# Patient Record
Sex: FEMALE | Race: OTHER | Employment: UNEMPLOYED | URBAN - METROPOLITAN AREA
[De-identification: names, ages, dates, MRNs, and addresses within clinical notes are randomized per-mention and may not be internally consistent; named-entity substitution may affect disease eponyms.]

---

## 2018-05-19 ENCOUNTER — HOSPITAL ENCOUNTER (INPATIENT)
Age: 73
LOS: 1 days | Discharge: HOME | DRG: 392 | End: 2018-05-21
Attending: EMERGENCY MEDICINE | Admitting: INTERNAL MEDICINE
Payer: SELF-PAY

## 2018-05-19 DIAGNOSIS — E87.1 HYPONATREMIA: Primary | ICD-10-CM

## 2018-05-19 LAB
ALBUMIN SERPL-MCNC: 3.9 G/DL (ref 3.2–4.6)
ALBUMIN/GLOB SERPL: 0.9 {RATIO} (ref 1.2–3.5)
ALP SERPL-CCNC: 85 U/L (ref 50–136)
ALT SERPL-CCNC: 27 U/L (ref 12–65)
ANION GAP SERPL CALC-SCNC: 13 MMOL/L (ref 7–16)
AST SERPL-CCNC: 47 U/L (ref 15–37)
BASOPHILS # BLD: 0 K/UL (ref 0–0.2)
BASOPHILS NFR BLD: 0 % (ref 0–2)
BILIRUB SERPL-MCNC: 1 MG/DL (ref 0.2–1.1)
BUN SERPL-MCNC: 19 MG/DL (ref 8–23)
CALCIUM SERPL-MCNC: 9.2 MG/DL (ref 8.3–10.4)
CHLORIDE SERPL-SCNC: 72 MMOL/L (ref 98–107)
CO2 SERPL-SCNC: 31 MMOL/L (ref 21–32)
CREAT SERPL-MCNC: 1.23 MG/DL (ref 0.6–1)
DIFFERENTIAL METHOD BLD: ABNORMAL
EOSINOPHIL # BLD: 0.5 K/UL (ref 0–0.8)
EOSINOPHIL NFR BLD: 5 % (ref 0.5–7.8)
ERYTHROCYTE [DISTWIDTH] IN BLOOD BY AUTOMATED COUNT: 11.8 % (ref 11.9–14.6)
GLOBULIN SER CALC-MCNC: 4.4 G/DL (ref 2.3–3.5)
GLUCOSE SERPL-MCNC: 96 MG/DL (ref 65–100)
HCT VFR BLD AUTO: 33.8 % (ref 35.8–46.3)
HGB BLD-MCNC: 12.8 G/DL (ref 11.7–15.4)
IMM GRANULOCYTES # BLD: 0.1 K/UL (ref 0–0.5)
IMM GRANULOCYTES NFR BLD AUTO: 1 % (ref 0–5)
LYMPHOCYTES # BLD: 2.7 K/UL (ref 0.5–4.6)
LYMPHOCYTES NFR BLD: 24 % (ref 13–44)
MCH RBC QN AUTO: 31.1 PG (ref 26.1–32.9)
MCHC RBC AUTO-ENTMCNC: 37.9 G/DL (ref 31.4–35)
MCV RBC AUTO: 82 FL (ref 79.6–97.8)
MONOCYTES # BLD: 1.1 K/UL (ref 0.1–1.3)
MONOCYTES NFR BLD: 10 % (ref 4–12)
NEUTS SEG # BLD: 6.7 K/UL (ref 1.7–8.2)
NEUTS SEG NFR BLD: 60 % (ref 43–78)
PLATELET # BLD AUTO: 272 K/UL (ref 150–450)
PMV BLD AUTO: 9.9 FL (ref 10.8–14.1)
POTASSIUM SERPL-SCNC: 4 MMOL/L (ref 3.5–5.1)
PROT SERPL-MCNC: 8.3 G/DL (ref 6.3–8.2)
RBC # BLD AUTO: 4.12 M/UL (ref 4.05–5.25)
SODIUM SERPL-SCNC: 116 MMOL/L (ref 136–145)
WBC # BLD AUTO: 11.2 K/UL (ref 4.3–11.1)

## 2018-05-19 PROCEDURE — 80053 COMPREHEN METABOLIC PANEL: CPT | Performed by: EMERGENCY MEDICINE

## 2018-05-19 PROCEDURE — 85025 COMPLETE CBC W/AUTO DIFF WBC: CPT | Performed by: EMERGENCY MEDICINE

## 2018-05-19 PROCEDURE — 99284 EMERGENCY DEPT VISIT MOD MDM: CPT | Performed by: EMERGENCY MEDICINE

## 2018-05-19 NOTE — IP AVS SNAPSHOT
Summary of Care Report The Summary of Care report has been created to help improve care coordination. Users with access to [employer] or [employer] [facility] (Web-based application) may access additional patient information including the Discharge Summary. If you are not currently a [address] user and need more information, please call the number listed below in the [place] [address] section and ask to be connected with Medical Records. Facility Information Name Address Phone [address] [address] [address] [phone] Patient Information Patient Name Sex SAEID Tran ([id number]) Female 1945 Discharge Information Admitting Provider Service Area Unit Tae Earl MD / [address] [facility] / [phone] Discharge Provider Discharge Date/Time Discharge Disposition Destination (none) 2018 Midday (Pending) AHR (none) Patient Language Language Romansh [40] Hospital Problems as of 2018  Reviewed: 2018  1:22 AM by Tae Ealr MD  
  
  
  
 Class Noted - Resolved Last Modified POA Active Problems * (Principal)Acute hyponatremia  2018 - Present 2018 by Tae Earl MD Unknown Entered by Tae Earl MD  
  Hypertension  2018 - Present 2018 by Tae Earl MD Yes Entered by Tae Earl MD  
  Nausea & vomiting  2018 - Present 2018 by Tae Earl MD Yes Entered by Tae Earl MD  
  
Non-Hospital Problems as of 2018  Reviewed: 2018  1:22 AM by Tae Earl MD  
 None You are allergic to the following No active allergies Current Discharge Medication List  
  
Notice You have not been prescribed any medications. Follow-up Information Follow up With Details Comments Contact Info None   None (395) Patient stated that they have no PCP [facility] Kathi Go in 1 week Need to follow up with MD in 1 week. May go to [facility] [address] [place] [address] 
[phone] Discharge Instructions Presión arterial disha: Instrucciones de cuidado - [ High Blood Pressure: Care Instructions ] Instrucciones de cuidado Si varghese presión arterial suele ser superior a 140/90, usted tiene presión arterial disha o hipertensión. [place] significa que el número de [place] es 140 o superior o que el número de abajo es 90 o superior, o ambas cosas. A pesar de lo que mucha gente zuly, la hipertensión no suele causar dolor de susanna ni provocar mareos o aturdimiento. Generalmente, no presenta síntomas. Sin embargo, aumenta el riesgo de ataque al corazón, ataque cerebral, y daños en los riñones o en los ojos. A mayor presión arterial, mayor riesgo. Varghese médico le dará quoc meta para varghese presión arterial. Varghese meta se basará en varghese alice y varghese edad. Un ejemplo de meta es mantener varghese presión arterial por debajo de 140/90. Los cambios de estilo de tanya, demetrio alimentarse en forma saludable y [place], siempre son importantes para ayudarle a bajar la presión arterial. También podría violetta medicamentos para lograr varghese meta para la presión arterial. 
La atención de seguimiento es quoc parte clave de varghese tratamiento y seguridad. Asegúrese de hacer y acudir a todas las citas, y llame a varghese médico si está teniendo problemas. También es quoc buena idea saber los resultados de los exámenes y mantener quoc lista de los medicamentos que racheal. Cómo puede cuidarse en el hogar? Tratamiento médico 
· Si choco de violetta molly medicamentos, varghese presión arterial volverá a subir. Es posible que deba violetta un tipo de [place], o más de [place], para reducir la presión arterial. Sea sil con los medicamentos.  [place] medicamento exactamente demetrio se lo hayan indicado. Llame a varghese médico si zuly estar teniendo problemas con varghese medicamento. · Hable con varghese médico antes de empezar a violetta [employer]. La aspirina puede ayudar a determinadas personas a reducir varghese riesgo de tener un ataque cardíaco o un ataque cerebral. Dunia violetta aspirina no es adecuado para todo el TRENGEREID, debido a que puede causar sangrado grave. · Visite a varghese médico periódicamente. Usted podría necesitar consultar a varghese médico con más frecuencia al principio o hasta que se reduzca varghese presión arterial. 
· Si usted está tomando medicamentos para la presión arterial, hable con varghese médico antes de violetta medicamentos descongestionantes o antiinflamatorios, demetrio ibuprofeno. Algunos de estos medicamentos pueden elevar la presión arterial. 
· Aprenda a revisarse la presión arterial en varghese hogar. Cambios en el estilo de tanya · Mantenga un peso saludable. [place] es particularmente importante si aumenta de peso en la tony de la cintura. Bajar solo 10 libras (4.5 kg) puede ayudarle a reducir varghese presión arterial. 
· Ana más ejercicios si varghese médico se lo recomienda. Caminar es quoc buena opción. Poco a poco, aumente la distancia que [employer]. Intente hacer por lo menos 30 minutos de ejercicio la mayoría de los días de la [place]. También podría nadar, montar en bicicleta o hacer otras actividades. · No tome alcohol o limite la cantidad que christine. Hable con varghese médico acerca de si puede violetta alcohol. · Trate de limitar la cantidad de sodio que consume a menos de 2,300 miligramos (mg) al día. Varghese médico podría pedirle que trate de consumir menos de 1,500 mg al día. · Coma abundantes frutas (demetrio bananas [plátanos] y naranjas), verduras, legumbres, granos integrales y productos lácteos de bajo contenido de Netta Luis Daniel. · Reduzca la cantidad de grasas saturadas en varghese dieta.  Los productos derivados de los [place], demetrio la [place], el queso y la carne, contienen grasas saturadas. El limitar estos alimentos podría ayudarle a bajar de peso y [place] reducir varghese riesgo de quoc enfermedad cardíaca. · No fume. El hábito de fumar aumenta varghese riesgo de ataque cerebral y ataque al corazón. Si necesita ayuda para dejar de fumar, hable con varghese médico sobre programas y medicamentos para dejar de fumar. Estos pueden aumentar molly probabilidades de dejar el hábito para siempre. Cuándo debe pedir ayuda? Llame al 911 en cualquier momento que considere que necesita atención de [place]. [place] puede significar que tiene síntomas que sugieren que varghese presión arterial le está causando un problema cardíaco o vascular grave. Varghese presión arterial podría ser superior a 180/110. ? Por ejemplo, llame al 911 si: 
? · Tiene síntomas de un ataque al corazón. Estos pueden incluir: ¨ Dolor o presión en el pecho, o quoc sensación extraña en el pecho. ¨ Sudoración. ¨ Falta de aire. ¨ Náuseas o vómito. ¨ Dolor, presión o quoc sensación extraña en la espalda, el griselda, la mandíbula, la parte superior del abdomen o en martina o ambos hombros o brazos. ¨ Aturdimiento o debilidad repentina. ¨ Latidos del corazón rápidos o irregulares. ? · Tiene síntomas de un ataque cerebral. Estos pueden incluir: 
¨ Entumecimiento, hormigueo, debilidad o parálisis repentinos en la vonnie, el brazo o la pierna, sobre todo en un solo lado del cuerpo. ¨ Cambios repentinos en la visión. ¨ Dificultades repentinas para hablar. ¨ Confusión repentina o dificultad súbita para comprender frases sencillas. ¨ Problemas repentinos para caminar o mantener el equilibrio. ¨ Dolor de [place] intenso y repentino, distinto de los darlyn de susanna anteriores. ? · Tiene un dolor intenso en la espalda o el abdomen. ? No espere a que la presión arterial baje por sí ana laura. Obtenga ayuda de inmediato. ? Llame a varghese médico ahora mismo o busque atención de inmediato si: 
?  · Tiene la presión arterial mucho más disha de lo normal (demetrio 180/110 o más disha), bulmaro no tiene síntomas. ? · Piensa que la presión arterial disha le está provocando síntomas, demetrio: ¨ Dolor de susanna intenso. [address] ? Vigile de cerca los [place], y asegúrese de comunicarse con varghese médico si: 
? · Varghese presión arterial mide 140/90 o más en al menos 2 ocasiones. Rule significa que el número de [place] es 140 o superior o el número de abajo es 90 o superior, o ambas cosas. ? · Amber que podría estar teniendo efectos secundarios de los medicamentos para la presión arterial.  
? · Varghese presión arterial suele ser normal, bulmaro se eleva por encima de lo normal en al menos 2 ocasiones. Dónde puede encontrar más información en inglés? Elder Slimmer a [email or web address]-simeon.info/. Kristina Records K136 en la búsqueda para aprender más acerca de \"Presión arterial disha: Instrucciones de cuidado - [ High Blood Pressure: Care Instructions ]. \" 
Revisado: 21 septiembre, 2016 Versión del contenido: 11.4 © 4602-6532 Healthwise, Incorporated. Las instrucciones de cuidado fueron adaptadas bajo licencia por [employer] (which disclaims liability or warranty for this information). Si usted tiene [place] afección médica o sobre estas instrucciones, siempre pregunte a varghese profesional de alice. Healthwise, Incorporated niega toda garantía o responsabilidad por varghese uso de esta información. Hiponatremia: Instrucciones de cuidado - [ Hyponatremia: Care Instructions ] Instrucciones de cuidado Hiponatremia significa que no tiene suficiente sodio en [place] [employer]. Puede causar náuseas, vómitos y dolor de Dyan Bright. O puede que usted no tenga apetito. En casos graves, puede causar convulsiones, coma o incluso la [place]. La hiponatremia no es quoc enfermedad. Es un problema causado por algo más, demetrio medicamentos o hacer ejercicio por mucho tiempo cuando hace calor.  
Puede desarrollar hiponatremia si pierde mucho líquido y [employer] grandes cantidades de agua u otros líquidos que no contienen [employer]. También puede desarrollarla si tiene problemas del Verita Larisa, del hígado o del corazón u otros problemas de alice. El tratamiento se centra en normalizar molly niveles de [place]. La atención de seguimiento es quoc parte clave de varghese tratamiento y seguridad. Asegúrese de hacer y acudir a todas las citas, y llame a varghese médico si está teniendo problemas. También es quoc buena idea saber los resultados de los exámenes y mantener quoc lista de los medicamentos que racheal. Cómo puede cuidarse en el hogar? · Si varghese médico se lo recomienda, tracy líquidos que contengan sodio. Las bebidas deportivas son [place] opción. O puede comer alimentos salados. · Si varghese médico se lo recomienda, limite la cantidad de agua que christine. Y limite los líquidos [place] parte es agua. Estos incluyen el té, el café y [place]. · [employer] medicamentos exactamente demetrio le fueron recetados. Llame a varghese médico si tiene algún problema con los medicamentos. · Hágase análisis para controlar molly niveles de sodio cuando se lo diga varghese médico. 

Cuándo debe pedir ayuda? Llame al 911 en cualquier momento que considere que necesita atención de emergencia. Por ejemplo, llame si: 
? · Tiene convulsiones. ? · Se desmayó (perdió el conocimiento). ?Llame a varghese médico ahora mismo o busque atención médica inmediata si: 
? · Está confuso o no puede concentrarse. ? · Tiene poco o nada de apetito. ? · Siente el estómago revuelto o vomita. ? · Tiene dolor de susanna. ? · Tiene cambios en el estado de ánimo. ? · Se siente más fatigado que de costumbre. ?Preste especial atención a los cambios en vagrhese alice y asegúrese de comunicarse con varghese médico si: 
? · No mejora demetrio se esperaba. Dónde puede encontrar más información en inglés? Emily Scrivener a http://[email or web address]-simeon.info/.  
Daily Rodriguez B230 en la búsqueda para aprender Justen Quiroz de \"Hiponatremia: Instrucciones de cuidado - [ Hyponatremia: Care Instructions ]. \" 
Revisado: 14 octubre, 2016 Versión del contenido: 11.4 © 0532-5003 Healthwise, Incorporated. Las instrucciones de cuidado fueron adaptadas bajo licencia por [employer] (which disclaims liability or warranty for this information). Si usted tiene [place] afección médica o sobre estas instrucciones, siempre pregunte a varghese profesional de alice. Healthwise, Incorporated niega toda garantía o responsabilidad por varghese uso de esta información. DISCHARGE SUMMARY from Nurse PATIENT INSTRUCTIONS: 
 
After general anesthesia or intravenous sedation, for 24 hours or while taking prescription Narcotics: · Limit your activities · Do not drive and operate hazardous machinery · Do not make important personal or business decisions · Do  not drink alcoholic beverages · If you have not urinated within 8 hours after discharge, please contact your surgeon on call. Report the following to your surgeon: 
· Excessive pain, swelling, redness or odor of or around the surgical area · Temperature over 100.5 · Nausea and vomiting lasting longer than 4 hours or if unable to take medications · Any signs of decreased circulation or nerve impairment to extremity: change in color, persistent  numbness, tingling, coldness or increase pain · Any questions What to do at Home: *  Please give a list of your current medications to your Primary Care Provider. *  Please update this list whenever your medications are discontinued, doses are 
    changed, or new medications (including over-the-counter products) are added. *  Please carry medication information at all times in case of emergency situations. These are general instructions for a healthy lifestyle: No smoking/ No tobacco products/ Avoid exposure to second hand smoke Surgeon General's Warning:  Quitting smoking now greatly reduces serious risk to your health. Obesity, smoking, and sedentary lifestyle greatly increases your risk for illness A healthy diet, regular physical exercise & weight monitoring are important for maintaining a healthy lifestyle You may be retaining fluid if you have a history of heart failure or if you experience any of the following symptoms:  Weight gain of 3 pounds or more overnight or 5 pounds in a week, increased swelling in our hands or feet or shortness of breath while lying flat in bed. Please call your doctor as soon as you notice any of these symptoms; do not wait until your next office visit. Recognize signs and symptoms of STROKE: 
 
F-face looks uneven A-arms unable to move or move unevenly S-speech slurred or non-existent T-time-call 911 as soon as signs and symptoms begin-DO NOT go Back to bed or wait to see if you get better-TIME IS BRAIN. Warning Signs of HEART ATTACK Call 911 if you have these symptoms: 
? Chest discomfort. Most heart attacks involve discomfort in the center of the chest that lasts more than a few minutes, or that goes away and comes back. It can feel like uncomfortable pressure, squeezing, fullness, or pain. ? Discomfort in other areas of the upper body. Symptoms can include pain or discomfort in one or both arms, the back, neck, jaw, or stomach. ? Shortness of breath with or without chest discomfort. ? Other signs may include breaking out in a cold sweat, nausea, or lightheadedness. Don't wait more than five minutes to call [address]! Fast action can save your life. Calling 911 is almost always the fastest way to get lifesaving treatment. Emergency Medical Services staff can begin treatment when they arrive  up to an hour sooner than if someone gets to the hospital by car. The discharge information has been reviewed with the patient. The patient verbalized understanding.  
Discharge medications reviewed with the patient and appropriate educational materials and side effects teaching were provided. ___________________________________________________________________________________________________________________________________ Chart Review Routing History No Routing History on File

## 2018-05-19 NOTE — IP AVS SNAPSHOT
[address] [facility] [address] 
 
 
 [address] [facility] 322 [facility] 
[phone] Patient: Dali Bush MRN: [id number] DFE:[id number] About your hospitalization You were admitted on:  May 20, 2018 You last received care in the:  [facility] [place] You were discharged on:  May 21, 2018 Why you were hospitalized Your primary diagnosis was:  Acute Hyponatremia Your diagnoses also included:  Hypertension, Nausea & Vomiting Follow-up Information Follow up With Details Comments Contact Info None   None (395) Patient stated that they have no PCP [facility] Kathi Go in 1 week Need to follow up with MD in 1 week. May go to [facility] [address] [facility] [place] [address] 
[phone] Discharge Orders None A check bismark indicates which time of day the medication should be taken. My Medications Notice You have not been prescribed any medications. Discharge Instructions Presión arterial disha: Instrucciones de cuidado - [ High Blood Pressure: Care Instructions ] Instrucciones de cuidado Si varghese presión arterial suele ser superior a 140/90, usted tiene presión arterial disha o hipertensión. [place] significa que el número de [place] es 140 o superior o que el número de abajo es 90 o superior, o ambas cosas. A pesar de lo que mucha gente zuly, la hipertensión no suele causar dolor de susanna ni provocar mareos o aturdimiento. Generalmente, no presenta síntomas. Sin embargo, aumenta el riesgo de ataque al corazón, ataque cerebral, y daños en los riñones o en los ojos. A mayor presión arterial, mayor riesgo. Varghese médico le dará quoc meta para varghese presión arterial. Varghese meta se basará en varghese alice y varghese edad. Un ejemplo de meta es mantener varghese presión arterial por debajo de 140/90.  
Los cambios de estilo de tanya, demetrio alimentarse en forma saludable y hacer actividad, siempre son importantes para ayudarle a bajar la presión arterial. También podría violetta medicamentos para lograr varghese meta para la presión arterial. 
La atención de seguimiento es quoc parte clave de varghese tratamiento y seguridad. Asegúrese de hacer y acudir a todas las citas, y llame a varghese médico si está teniendo problemas. También es quoc buena idea saber los resultados de los exámenes y mantener quoc lista de los medicamentos que racheal. Cómo puede cuidarse en el hogar? Tratamiento médico 
· Si choco de violetta molly medicamentos, varghese presión arterial volverá a subir. Es posible que deba violetta un tipo de [place], o más de [place], para reducir la presión arterial. Sea sil con los medicamentos. [place] varghese medicamento exactamente demetrio se lo hayan indicado. Llame a varghese médico si zuly estar teniendo problemas con varghese medicamento. · Hable con varghese médico antes de empezar a violetta Natanael Hotels. La aspirina puede ayudar a determinadas personas a reducir varghese riesgo de tener un ataque cardíaco o un ataque cerebral. Dunia violetta aspirina no es adecuado para todo el TRENGEREID, debido a que puede causar sangrado grave. · Visite a varghese médico periódicamente. Usted podría necesitar consultar a varghese médico con más frecuencia al principio o hasta que se reduzca varghese presión arterial. 
· Si usted está tomando medicamentos para la presión arterial, hable con varghese médico antes de violetta medicamentos descongestionantes o antiinflamatorios, demetrio ibuprofeno. Algunos de estos medicamentos pueden elevar la presión arterial. 
· Aprenda a revisarse la presión arterial en varghese hogar. Cambios en el estilo de tanya · Mantenga un peso saludable. [place] es particularmente importante si aumenta de peso en la tony de la cintura. Bajar solo 10 libras (4.5 kg) puede ayudarle a reducir varghese presión arterial. 
· Ana más ejercicios si varghese médico se lo recomienda. Caminar es quoc buena opción. Poco a poco, aumente la distancia que [employer]. Intente hacer por lo menos 30 minutos de ejercicio la mayoría de los días de la Syl[place]. También podría nadar, montar en bicicleta o hacer otras actividades. · No tome alcohol o limite la cantidad que christine. Hable con varghese médico acerca de si puede violetta alcohol. · Trate de limitar la cantidad de sodio que consume a menos de 2,300 miligramos (mg) al día. Varghese médico podría pedirle que trate de consumir menos de 1,500 mg al día. · Coma abundantes frutas (demetrio bananas [plátanos] y naranjas), verduras, legumbres, granos integrales y productos lácteos de bajo contenido de [place]. · Reduzca la cantidad de grasas saturadas en varghese dieta. Los productos derivados de los [place], demetrio la [place], el queso y la carne, contienen grasas saturadas. El limitar estos alimentos podría ayudarle a bajar de peso y [place] reducir varghese riesgo de quoc enfermedad cardíaca. · No fume. El hábito de fumar aumenta varghese riesgo de ataque cerebral y ataque al corazón. Si necesita ayuda para dejar de fumar, hable con varghese médico sobre programas y medicamentos para dejar de fumar. Estos pueden aumentar molly probabilidades de dejar el hábito para siempre. Cuándo debe pedir ayuda? Llame al 911 en cualquier momento que considere que necesita atención de [place]. [place] puede significar que tiene síntomas que sugieren que varghese presión arterial le está causando un problema cardíaco o vascular grave. Varghese presión arterial podría ser superior a 180/110. ? Por ejemplo, llame al 911 si: 
? · Tiene síntomas de un ataque al corazón. Estos pueden incluir: ¨ Dolor o presión en el pecho, o quoc sensación extraña en el pecho. ¨ Sudoración. ¨ Falta de aire. ¨ Náuseas o vómito. ¨ Dolor, presión o quoc sensación extraña en la espalda, el griselda, la mandíbula, la parte superior del abdomen o en martina o ambos hombros o brazos. ¨ Aturdimiento o debilidad repentina. ¨ Latidos del corazón rápidos o irregulares. ? · Tiene síntomas de un ataque cerebral. Estos pueden incluir: ¨ Entumecimiento, hormigueo, debilidad o parálisis repentinos en la vonnie, el brazo o la pierna, sobre todo en un solo lado del cuerpo. ¨ Cambios repentinos en la visión. ¨ Dificultades repentinas para hablar. ¨ Confusión repentina o dificultad súbita para comprender frases sencillas. ¨ Problemas repentinos para caminar o mantener el equilibrio. ¨ Dolor de [place] intenso y repentino, distinto de los darlyn de susanna anteriores. ? · Tiene un dolor intenso en la espalda o el abdomen. ? No espere a que la presión arterial baje por sí ana laura. Obtenga ayuda de inmediato. ? Llame a varghese médico ahora mismo o busque atención de inmediato si: 
? · Tiene la presión arterial mucho más disha de lo normal (demetrio 180/110 o más disha), bulmaro no tiene síntomas. ? · Piensa que la presión arterial disha le está provocando síntomas, demetrio: ¨ Dolor de susanna intenso. [address] ? Vigile de cerca los [facility], y asegúrese de comunicarse con varghese médico si: 
? · Varghese presión arterial mide 140/90 o más en al menos 2 ocasiones. [place] significa que el número de [place] es 140 o superior o el número de [facility] es 90 o superior, o ambas cosas. ? · Amber que podría estar teniendo efectos secundarios de los medicamentos para la presión arterial.  
? · Varghese presión arterial suele ser normal, bulmaro se eleva por encima de lo normal en al menos 2 ocasiones. Dónde puede encontrar más información en inglés? Richard Gilby a [email or web address]-[email or web address].info/. [place]ton Ashley H298 en la búsqueda para aprender más acerca de \"Presión arterial disha: Instrucciones de cuidado - [ High Blood Pressure: Care Instructions ]. \" 
Revisado: 21 septiembre, 2016 Versión del contenido: 11.4 © 4876-9247 Healthwise, adhoclabs. Las instrucciones de cuidado fueron adaptadas bajo licencia por Good Help Connections (which disclaims liability or warranty for this information).  Si usted tiene preguntas sobre quoc afección médica o sobre estas instrucciones, siempre pregunte a varghese profesional de alice. [facility] niega toda garantía o responsabilidad por varghese uso de esta información. Hiponatremia: Instrucciones de cuidado - [ Hyponatremia: Care Instructions ] Instrucciones de cuidado Hiponatremia significa que no tiene suficiente sodio en [place] [employer]. Puede causar náuseas, vómitos y dolor de [place]. O puede que usted no tenga apetito. En casos graves, puede causar convulsiones, coma o incluso la [place]. La hiponatremia no es quoc enfermedad. Es un problema causado por algo más, demetrio medicamentos o hacer ejercicio por mucho tiempo cuando hace calor. Puede desarrollar hiponatremia si pierde mucho líquido y luego christine grandes cantidades de agua u otros líquidos que no contienen mucho sodio. También puede desarrollarla si tiene problemas del [place], del hígado o del corazón u otros problemas de alice. El tratamiento se centra en normalizar molly niveles de [place]. La atención de seguimiento es quoc parte clave de varghese tratamiento y seguridad. Asegúrese de hacer y acudir a todas las citas, y llame a varghese médico si está teniendo problemas. También es quoc buena idea saber los resultados de los exámenes y mantener quoc lista de los medicamentos que racheal. Cómo puede cuidarse en el hogar? · Si varghese médico se lo recomienda, tracy líquidos que contengan sodio. Las bebidas deportivas son Leita Kale opción. O puede comer alimentos salados. · Si varghese médico se lo recomienda, limite la cantidad de agua que christine. Y limite los líquidos Yvetta Bee parte es agua. Estos incluyen el té, el café y [place]. · [employer] medicamentos exactamente demetrio le fueron recetados. Llame a varghese médico si tiene algún problema con los medicamentos. · Hágase análisis para controlar molly niveles de sodio cuando se lo diga varghese médico. 

Cuándo debe pedir ayuda?  
Llame al 911 en cualquier momento que considere que necesita atención de emergencia. Por ejemplo, llame si: 
? · Tiene convulsiones. ? · Se desmayó (perdió el conocimiento). ?Llame a varghese médico ahora mismo o busque atención médica inmediata si: 
? · Está confuso o no puede concentrarse. ? · Tiene poco o nada de apetito. ? · Siente el estómago revuelto o vomita. ? · Tiene dolor de susanna. ? · Tiene cambios en el estado de ánimo. ? · Se siente más fatigado que de costumbre. ?Preste especial atención a los cambios en varghese alice y asegúrese de comunicarse con varghese médico si: 
? · No mejora demetrio se esperaba. Dónde puede encontrar más información en inglés? Biju Doyle a http://sebastian-simeon.info/. Eugene SANTOS990 en la búsqueda para aprender más acerca de \"Hiponatremia: Instrucciones de cuidado - [ Hyponatremia: Care Instructions ]. \" 
Revisado: 14 octubre, 2016 Versión del contenido: 11.4 © 3487-8649 Healthwise, Incorporated. Las instrucciones de cuidado fueron adaptadas bajo licencia por Good Help Connections (which disclaims liability or warranty for this information). Si usted tiene [place] afección médica o sobre estas instrucciones, siempre pregunte a varghese profesional de alice. Healthwise, Incorporated niega toda garantía o responsabilidad por varghese uso de esta información. DISCHARGE SUMMARY from Nurse PATIENT INSTRUCTIONS: 
 
After general anesthesia or intravenous sedation, for 24 hours or while taking prescription Narcotics: · Limit your activities · Do not drive and operate hazardous machinery · Do not make important personal or business decisions · Do  not drink alcoholic beverages · If you have not urinated within 8 hours after discharge, please contact your surgeon on call. Report the following to your surgeon: 
· Excessive pain, swelling, redness or odor of or around the surgical area · Temperature over 100.5 · Nausea and vomiting lasting longer than 4 hours or if unable to take medications · Any signs of decreased circulation or nerve impairment to extremity: change in color, persistent  numbness, tingling, coldness or increase pain · Any questions What to do at Home: *  Please give a list of your current medications to your Primary Care Provider. *  Please update this list whenever your medications are discontinued, doses are 
    changed, or new medications (including over-the-counter products) are added. *  Please carry medication information at all times in case of emergency situations. These are general instructions for a healthy lifestyle: No smoking/ No tobacco products/ Avoid exposure to second hand smoke Surgeon General's Warning:  Quitting smoking now greatly reduces serious risk to your health. Obesity, smoking, and sedentary lifestyle greatly increases your risk for illness A healthy diet, regular physical exercise & weight monitoring are important for maintaining a healthy lifestyle You may be retaining fluid if you have a history of heart failure or if you experience any of the following symptoms:  Weight gain of 3 pounds or more overnight or 5 pounds in a week, increased swelling in our hands or feet or shortness of breath while lying flat in bed. Please call your doctor as soon as you notice any of these symptoms; do not wait until your next office visit. Recognize signs and symptoms of STROKE: 
 
F-face looks uneven A-arms unable to move or move unevenly S-speech slurred or non-existent T-time-call 911 as soon as signs and symptoms begin-DO NOT go Back to bed or wait to see if you get better-TIME IS BRAIN. Warning Signs of HEART ATTACK Call 911 if you have these symptoms: 
? Chest discomfort. Most heart attacks involve discomfort in the center of the chest that lasts more than a few minutes, or that goes away and comes back. It can feel like uncomfortable pressure, squeezing, fullness, or pain. ? Discomfort in other areas of the upper body. Symptoms can include pain or discomfort in one or both arms, the back, neck, jaw, or stomach. ? Shortness of breath with or without chest discomfort. ? Other signs may include breaking out in a cold sweat, nausea, or lightheadedness. Don't wait more than five minutes to call [address]! Fast action can save your life. Calling 911 is almost always the fastest way to get lifesaving treatment. Emergency Medical Services staff can begin treatment when they arrive  up to an hour sooner than if someone gets to the hospital by car. The discharge information has been reviewed with the patient. The patient verbalized understanding. Discharge medications reviewed with the patient and appropriate educational materials and side effects teaching were provided. ___________________________________________________________________________________________________________________________________ Comput[employer]art Announcement We are excited to announce that we are making your provider's discharge notes available to you in PassivSystemst. You will see these notes when they are completed and signed by the physician that discharged you from your recent hospital stay. If you have any questions or concerns about any information you see in PassivSystemst, please call the Health Information Department where you were seen or reach out to your Primary Care Provider for more information about your plan of care. Introducing [facility] & HEALTH SERVICES! [facility] introduce portal paciente Computehart . Ahora se puede acceder a partes de varghese expediente médico, enviar por correo electrónico la oficina de varghese médico y solicitar renovaciones de medicamentos en línea. En varghese navegador de Internet , [employer]orn [employer] a [email or web address] Ana liss en el usuario por Stewart Fernando? Starlet Ligas clic aquí en la sesión Kimberlee Rede. Verá la página de registro [place]. Ingrese varghese código de Bank [employer] Lisa abigail y demetrio aparece a continuación. Usted no tendrá que [employer] código después de cuate completado el proceso de registro . Si usted no se inscribe antes de la fecha de caducidad , debe solicitar un nuevo código. · MyChart Código de acceso : [id number] Expires: 8/17/2018 10:45 PM 
 
Ingresa los últimos cuatro dígitos de varghese Número de Seguro Social ( xxxx ) y fecha de nacimiento ( dd / mm / aaaa ) demetrio se indica y ana clic en Enviar. Usted será llevado a la siguiente página de registro . Crear un ID MyChart . Esta será varghese ID de inicio de sesión de MyChart y no puede ser [place] , por lo que pensar en quoc que es [place] y fácil de recordar . Crear quoc contraseña MyChart . Usted puede cambiar varghese contraseña en cualquier momento . Ingrese varghese Password Reset de preguntas y [place] . [place] se puede utilizar en un momento posterior si usted olvida varghese contraseña. Introduzca varghese dirección de correo electrónico . Kervin [place] recibirá quoc notificación por correo electrónico cuando la nueva información está disponible en MyChart . Edmoresepideh Carvajal clic en Registrarse. Hua Chandler nolvia y descargar porciones de varghese expediente médico. 
Ana liss en el enlace de descarga del menú Resumen para descargar quoc copia portátil de varghese información médica . Si tiene [employer] , por favor visite la sección de preguntas frecuentes del sitio web MyChart . Recuerde, MyChart NO es que se utilizará para las necesidades urgentes. Para emergencias médicas , llame al 911 . Ahora disponible en varghese iPhone y Android ! Introducing Amanuel Syed As a [facility] patient, I wanted to make you aware of our electronic visit tool called Amanuel Syed. Thiago Roberto 24/7 allows you to connect within minutes with a medical provider 24 hours a day, seven days a week via a mobile device or tablet or logging into a secure website from your computer.   You can access [facility] Ayana 24/7 from anywhere in the [place]. A virtual visit might be right for you when you have a simple condition and feel like you just dont want to get out of bed, or cant get away from work for an appointment, when your regular [facility] provider is not available (evenings, weekends or holidays), or when youre out of town and need minor care. Electronic visits cost only $49 and if the [email or web address] 24/7 provider determines a prescription is needed to treat your condition, one can be electronically transmitted to a nearby pharmacy*. Please take a moment to enroll today if you have not already done so. The enrollment process is free and takes just a few minutes. To enroll, please download the [email or web address] 24/7 jojo to your tablet or phone, or visit [email or web address] to enroll on your computer. And, as an [address] [place] [address] patient with a PHARMAJET account, the results of your visits will be scanned into your electronic medical record and your primary care provider will be able to view the scanned results. We urge you to continue to see your regular [facility] provider for your ongoing medical care. And while your primary care provider may not be the one available when you seek a Amanuel Rustamessie virtual visit, the peace of mind you get from getting a real diagnosis real time can be priceless. For more information on Amanuel Syed, view our Frequently Asked Questions (FAQs) at [email or web address]. Sincerely, 
 
Selam You MD 
Chief Medical Officer [facility] Kait Valverde *:  certain medications cannot be prescribed via Amanuel iMemoriesessie Providers Seen During Your Hospitalization Provider Specialty Primary office phone Madalyn Dang MD Emergency Medicine [phone] Bulmaro De La Cruz MD Internal Medicine [phone] Your Primary Care Physician (PCP) Primary Care Physician Office Phone Office Fax  NONE ** None ** ** None **  
 You are allergic to the following No active allergies Recent Documentation Height Weight BMI Smoking Status 1.524 m 64.3 kg 27.69 kg/m2 Former Smoker Emergency Contacts Name Discharge Info Relation Home Work Mobile Zan Cheatham  Other Relative [6] [phone] Patient Belongings The following personal items are in your possession at time of discharge: 
     Visual Aid: None             Clothing: With patient Please provide this summary of care documentation to your next provider. Signatures-by signing, you are acknowledging that this After Visit Summary has been reviewed with you and you have received a copy. Patient Signature:  ____________________________________________________________ Date:  ____________________________________________________________  
  
Modestostephen Wall Provider Signature:  ____________________________________________________________ Date:  ____________________________________________________________  
  
  
   
  
Lina Hernadez [address] [facility] 
[phone] Patient: Maricel Espino MRN: [id number] JTM:[id number] Sobre pradhan hospitalización Le admitieron el:  May 20, 2018 Pradhan tratamiento más reciente fue el:  [facility] 8 INTERMEDIATE CARE UNIT Le dieron de disha el:  May 21, 2018 Por qué le ingresaron Pradhan diagnosis primaria es:  Acute Hyponatremia Pradhan diagnosis también incluye:  Hypertension, Nausea & Vomiting Follow-up Information Follow up With Details Comments Contact Info None   None (395) Patient stated that they have no PCP [employer] Kathi Go in 1 week Need to follow up with MD in 1 week. May go to [facility] [address] [facility] [address] [phone] Discharge Orders [employer] A check bismark indicates which time of day the medication should be taken. My Medications Derrill Negus No se le javed recetado ningún medicamento. Instrucciones a farhat de disha Presión arterial disha: Instrucciones de cuidado - [ High Blood Pressure: Care Instructions ] Instrucciones de cuidado Si varghese presión arterial suele ser superior a 140/90, usted tiene presión arterial disha o hipertensión. [place] significa que el número de [place] es 140 o superior o que el número de abajo es 90 o superior, o ambas cosas. A pesar de lo que mucha gente zuly, la hipertensión no suele causar dolor de susanna ni provocar mareos o aturdimiento. Generalmente, no presenta síntomas. Sin embargo, aumenta el riesgo de ataque al corazón, ataque cerebral, y daños en los riñones o en los ojos. A mayor presión arterial, mayor riesgo. Varghese médico le dará quoc meta para varghese presión arterial. Varghese meta se basará en varghese alice y varghese edad. Un ejemplo de meta es mantener varghese presión arterial por debajo de 140/90. Los cambios de estilo de tanya, demetrio alimentarse en forma saludable y [place], siempre son importantes para ayudarle a bajar la presión arterial. También podría violetta medicamentos para lograr varghese meta para la presión arterial. 
La atención de seguimiento es quoc parte clave de varghese tratamiento y seguridad. Asegúrese de hacer y acudir a todas las citas, y llame a varghese médico si está teniendo problemas. También es quoc buena idea saber los resultados de los exámenes y mantener quoc lista de los medicamentos que racheal. Cómo puede cuidarse en el hogar? Tratamiento médico 
· Si choco de violetta molly medicamentos, varghese presión arterial volverá a subir. Es posible que deba violetta un tipo de [place], o más de [place], para reducir la presión arterial. Sea sil con los medicamentos. [place] varghese medicamento exactamente demetrio se lo hayan indicado. Llame a varghese médico si zuly estar teniendo problemas con varghese medicamento. · Hable con varghese médico antes de empezar a violetta [employer].  Oletha Cady aspirina puede ayudar a determinadas personas a reducir varghese riesgo de tener un ataque cardíaco o un ataque cerebral. Dunia violetta aspirina no es adecuado para todo el TRENGEREID, debido a que puede causar sangrado grave. · Visite a varghese médico periódicamente. Usted podría necesitar consultar a varghese médico con más frecuencia al principio o hasta que se reduzca varghese presión arterial. 
· Si usted está tomando medicamentos para la presión arterial, hable con varghese médico antes de violetta medicamentos descongestionantes o antiinflamatorios, demetrio ibuprofeno. Algunos de estos medicamentos pueden elevar la presión arterial. 
· Aprenda a revisarse la presión arterial en varghese hogar. Cambios en el estilo de tanya · Mantenga un peso saludable. [place] es particularmente importante si aumenta de peso en la tony de la cintura. Bajar solo 10 libras (4.5 kg) puede ayudarle a reducir varghese presión arterial. 
· Ana más ejercicios si varghese médico se lo recomienda. Caminar es quoc buena opción. Poco a poco, aumente la distancia que [employer]. Intente hacer por lo menos 30 minutos de ejercicio la mayoría de los días de la [place]. También podría nadar, montar en bicicleta o hacer otras actividades. · No tome alcohol o limite la cantidad que christine. Hable con varghese médico acerca de si puede violetta alcohol. · Trate de limitar la cantidad de sodio que consume a menos de 2,300 miligramos (mg) al día. Varghese médico podría pedirle que trate de consumir menos de 1,500 mg al día. · Coma abundantes frutas (demetrio bananas [plátanos] y naranjas), verduras, legumbres, granos integrales y productos lácteos de bajo contenido de Mirela walter. · Reduzca la cantidad de grasas saturadas en varghese dieta. Los productos derivados de los [place], demetrio la [place], el queso y la carne, contienen grasas saturadas. El limitar estos alimentos podría ayudarle a bajar de peso y [place] reducir varghese riesgo de quoc enfermedad cardíaca. · No fume.  El hábito de fumar aumenta varghese riesgo de ataque cerebral y ataque al corazón. Si necesita ayuda para dejar de fumar, hable con varghese médico sobre programas y medicamentos para dejar de fumar. Estos pueden aumentar molly probabilidades de dejar el hábito para siempre. Cuándo debe pedir ayuda? Llame al 911 en cualquier momento que considere que necesita atención de [place]. [place] puede significar que tiene síntomas que sugieren que varghese presión arterial le está causando un problema cardíaco o vascular grave. Varghese presión arterial podría ser superior a 180/110. ? Por ejemplo, llame al 911 si: 
? · Tiene síntomas de un ataque al corazón. Estos pueden incluir: ¨ Dolor o presión en el pecho, o quoc sensación extraña en el pecho. ¨ Sudoración. ¨ Falta de aire. ¨ Náuseas o vómito. ¨ Dolor, presión o quoc sensación extraña en la espalda, el griselda, la mandíbula, la parte superior del abdomen o en martina o ambos hombros o brazos. ¨ Aturdimiento o debilidad repentina. ¨ Latidos del corazón rápidos o irregulares. ? · Tiene síntomas de un ataque cerebral. Estos pueden incluir: 
¨ Entumecimiento, hormigueo, debilidad o parálisis repentinos en la vonnie, el brazo o la pierna, sobre todo en un solo lado del cuerpo. ¨ Cambios repentinos en la visión. ¨ Dificultades repentinas para hablar. ¨ Confusión repentina o dificultad súbita para comprender frases sencillas. ¨ Problemas repentinos para caminar o mantener el equilibrio. ¨ Dolor de [place] intenso y repentino, distinto de los darlyn de susanna anteriores. ? · Tiene un dolor intenso en la espalda o el abdomen. ? No espere a que la presión arterial baje por sí ana laura. Obtenga ayuda de inmediato. ? Llame a varghese médico ahora mismo o busque atención de inmediato si: 
? · Tiene la presión arterial mucho más disha de lo normal (demetrio 180/110 o más disha), bulmaro no tiene síntomas. ? · Piensa que la presión arterial disha le está provocando síntomas, demetrio: ¨ Dolor de susanna intenso. [address]. ?Vigile de cerca los [facility], y asegúrese de comunicarse con varghese médico si: 
? · Varghese presión arterial mide 140/90 o más en al menos 2 ocasiones. [place] significa que el número de [place] es 140 o superior o el número de abajo es 90 o superior, o ambas cosas. ? · Amber que podría estar teniendo efectos secundarios de los medicamentos para la presión arterial.  
? · Varghese presión arterial suele ser normal, bulmaro se eleva por encima de lo normal en al menos 2 ocasiones. Dónde puede encontrar más información en inglés? Narendra Viera a [email or web address] Kameron Olivo B711 en la búsqueda para aprender más acerca de \"Presión arterial disha: Instrucciones de cuidado - [ High Blood Pressure: Care Instructions ]. \" 
Revisado: 21 septiembre, 2016 Versión del contenido: 11.4 © 3032-5015 Healthwise, Incorporated. Las instrucciones de cuidado fueron adaptadas bajo licencia por [employer] (which disclaims liability or warranty for this information). Si usted tiene [place] afección médica o sobre estas instrucciones, siempre pregunte a varghese profesional de alice. Healthwise, Incorporated niega toda garantía o responsabilidad por varghese uso de esta información. Hiponatremia: Instrucciones de cuidado - [ Hyponatremia: Care Instructions ] Instrucciones de cuidado Hiponatremia significa que no tiene suficiente sodio en [place] [employer]. Puede causar náuseas, vómitos y dolor de [place]. O puede que usted no tenga apetito. En casos graves, puede causar convulsiones, coma o incluso la [place]. La hiponatremia no es quoc enfermedad. Es un problema causado por algo más, demetrio medicamentos o hacer ejercicio por mucho tiempo cuando hace calor. Puede desarrollar hiponatremia si pierde mucho líquido y luego christine grandes cantidades de agua u otros líquidos que no contienen mucho sodio. También puede desarrollarla si tiene problemas del Zollie Severin, del hígado o del corazón u otros problemas de alice. El tratamiento se centra en normalizar molly niveles de [place]. La atención de seguimiento es quoc parte clave de varghese tratamiento y seguridad. Asegúrese de hacer y acudir a todas las citas, y llame a varghese médico si está teniendo problemas. También es quoc buena idea saber los resultados de los exámenes y mantener quoc lista de los medicamentos que racheal. Cómo puede cuidarse en el hogar? · Si varghese médico se lo recomienda, tracy líquidos que contengan sodio. Las bebidas deportivas son Teena Anderson opción. O puede comer alimentos salados. · Si varghese médico se lo recomienda, limite la cantidad de agua que christine. Y limite los líquidos Ambika Bucker parte es agua. Estos incluyen el té, el café y [place]. · [employer] medicamentos exactamente demetrio le fueron recetados. Llame a varghese médico si tiene algún problema con los medicamentos. · Hágase análisis para controlar molly niveles de sodio cuando se lo diga varghese médico. 

Cuándo debe pedir ayuda? Llame al 911 en cualquier momento que considere que necesita atención de emergencia. Por ejemplo, llame si: 
? · Tiene convulsiones. ? · Se desmayó (perdió el conocimiento). ?Llame a varghese médico ahora mismo o busque atención médica inmediata si: 
? · Está confuso o no puede concentrarse. ? · Tiene poco o nada de apetito. ? · Siente el estómago revuelto o vomita. ? · Tiene dolor de susanna. ? · Tiene cambios en el estado de ánimo. ? · Se siente más fatigado que de costumbre. ?Preste especial atención a los cambios en varghese alice y asegúrese de comunicarse con varghese médico si: 
? · No mejora demetrio se esperaba. Dónde puede encontrar más información en inglés? Nicci Pedersen a [email or web address]-[email or web address].[email or web address] Ashanti Jordan A038 en la búsqueda para aprender más acerca de \"Hiponatremia: Instrucciones de cuidado - [ Hyponatremia: Care Instructions ]. \" 
Revisado: 14 octubre, 2016 Versión del contenido: 11.4 © 0465-4635 Healthwise, Incorporated.  Las instrucciones de cuidado fueron adaptadas bajo licencia por [employer] (which disclaims liability or warranty for this information). Si usted tiene [place] afección médica o sobre estas instrucciones, siempre pregunte a varghese profesional de alice. Healthwise, Incorporated niega toda garantía o responsabilidad por varghese uso de esta información. DISCHARGE SUMMARY from Nurse PATIENT INSTRUCTIONS: 
 
After general anesthesia or intravenous sedation, for 24 hours or while taking prescription Narcotics: · Limit your activities · Do not drive and operate hazardous machinery · Do not make important personal or business decisions · Do  not drink alcoholic beverages · If you have not urinated within 8 hours after discharge, please contact your surgeon on call. Report the following to your surgeon: 
· Excessive pain, swelling, redness or odor of or around the surgical area · Temperature over 100.5 · Nausea and vomiting lasting longer than 4 hours or if unable to take medications · Any signs of decreased circulation or nerve impairment to extremity: change in color, persistent  numbness, tingling, coldness or increase pain · Any questions What to do at Home: *  Please give a list of your current medications to your Primary Care Provider. *  Please update this list whenever your medications are discontinued, doses are 
    changed, or new medications (including over-the-counter products) are added. *  Please carry medication information at all times in case of emergency situations. These are general instructions for a healthy lifestyle: No smoking/ No tobacco products/ Avoid exposure to second hand smoke Surgeon General's Warning:  Quitting smoking now greatly reduces serious risk to your health. Obesity, smoking, and sedentary lifestyle greatly increases your risk for illness A healthy diet, regular physical exercise & weight monitoring are important for maintaining a healthy lifestyle You may be retaining fluid if you have a history of heart failure or if you experience any of the following symptoms:  Weight gain of 3 pounds or more overnight or 5 pounds in a week, increased swelling in our hands or feet or shortness of breath while lying flat in bed. Please call your doctor as soon as you notice any of these symptoms; do not wait until your next office visit. Recognize signs and symptoms of STROKE: 
 
F-face looks uneven A-arms unable to move or move unevenly S-speech slurred or non-existent T-time-call 911 as soon as signs and symptoms begin-DO NOT go Back to bed or wait to see if you get better-TIME IS BRAIN. Warning Signs of HEART ATTACK Call 911 if you have these symptoms: 
? Chest discomfort. Most heart attacks involve discomfort in the center of the chest that lasts more than a few minutes, or that goes away and comes back. It can feel like uncomfortable pressure, squeezing, fullness, or pain. ? Discomfort in other areas of the upper body. Symptoms can include pain or discomfort in one or both arms, the back, neck, jaw, or stomach. ? Shortness of breath with or without chest discomfort. ? Other signs may include breaking out in a cold sweat, nausea, or lightheadedness. Don't wait more than five minutes to call [address]! Fast action can save your life. Calling 911 is almost always the fastest way to get lifesaving treatment. Emergency Medical Services staff can begin treatment when they arrive  up to an hour sooner than if someone gets to the hospital by car. The discharge information has been reviewed with the patient. The patient verbalized understanding. Discharge medications reviewed with the patient and appropriate educational materials and side effects teaching were provided. ___________________________________________________________________________________________________________________________________ Jammithart Announcement We are excited to announce that we are making your provider's discharge notes available to you in Dealstruck. You will see these notes when they are completed and signed by the physician that discharged you from your recent hospital stay. If you have any questions or concerns about any information you see in Dealstruck, please call the Health Information Department where you were seen or reach out to your Primary Care Provider for more information about your plan of care. Introducing [address]! [facility] introduce portal paciente MyChart . Ahora se puede acceder a partes de varghese expediente médico, enviar por correo electrónico la oficina de varghese médico y solicitar renovaciones de medicamentos en línea. En varghese navegador de Internet , [place] Point a [email or web address] Brian clic en el usuario por Lisa Tomlinsonkhurram? Shirley Peeling clic aquí en la sesión [place]. Verá la página de registro [place]. Ingrese varghese código de [facility] abigail y demetrio aparece a continuación. Usted no tendrá que [employer] código después de cuate completado el proceso de registro . Si usted no se inscribe antes de la fecha de caducidad , debe solicitar un nuevo código. · [place] Código de acceso : [id number] Expires: 8/17/2018 10:45 PM 
 
Ingresa los últimos cuatro dígitos de varghese Número de Seguro Social ( xxxx ) y fecha de nacimiento ( dd / mm / aaaa ) demetrio se indica y brian clic en Enviar. ted será llevado a la siguiente página de registro . Crear un ID [place] . Esta será varghese ID de inicio de sesión de [facility] y no puede ser [place] , por lo que pensar en quoc que es [place] Jolene y fácil de recordar . Crear quoc contraseña [facility] . ted puede cambiar varghese contraseña en cualquier momento . Ingrese varghese Password Reset de preguntas y [place] . [place] se puede utilizar en un momento posterior si usted olvida varghese contraseña.  
Introduzca varghese dirección de correo electrónico . Ramona Fox recibirá José Issa notificación por correo electrónico cuando la nueva información está disponible en MyChart . Fidelia Lore clic en Registrarse. Nyla Basques nolvia y descargar porciones de varghese expediente médico. 
Ana clic en el enlace de descarga del menú Resumen para descargar quoc copia portátil de varghese información médica . Si tiene [employer] , por favor visite la sección de preguntas frecuentes del sitio web MyChart . Recuerde, MyChart NO es que se utilizará para las necesidades urgentes. Para emergencias médicas , llame al 911 . Ahora disponible en varghese iPhone y Android ! Introducing Amanuel Syed As a [employer] patient, I wanted to make you aware of our electronic visit tool called Amanuel Syed. [employer] 24/7 allows you to connect within minutes with a medical provider 24 hours a day, seven days a week via a mobile device or tablet or logging into a secure website from your computer. You can access Amanuel Syed from anywhere in the [place]. A virtual visit might be right for you when you have a simple condition and feel like you just dont want to get out of bed, or cant get away from work for an appointment, when your regular [employer] provider is not available (evenings, weekends or holidays), or when youre out of town and need minor care. Electronic visits cost only $49 and if the [employer] 24/7 provider determines a prescription is needed to treat your condition, one can be electronically transmitted to a nearby pharmacy*. Please take a moment to enroll today if you have not already done so. The enrollment process is free and takes just a few minutes. To enroll, please download the [employer] 24/7 jojo to your tablet or phone, or visit [email or web address] to enroll on your computer.    
And, as an [address] [place] [address] patient with a [employer] account, the results of your visits will be scanned into your electronic medical record and your primary care provider will be able to view the scanned results. We urge you to continue to see your regular [facility] provider for your ongoing medical care. And while your primary care provider may not be the one available when you seek a Amanuel Syed virtual visit, the peace of mind you get from getting a real diagnosis real time can be priceless. For more information on Amanuel Driverfin, view our Frequently Asked Questions (FAQs) at [email or web address]. Sincerely, 
 
Bella Pena MD 
Chief Medical Officer Shivam Valverde *:  certain medications cannot be prescribed via Amanuel Rustamvipulfin Providers Seen During Your Hospitalization Personal [facility] Teléfono principal de la oficina Juanito Bhandari MD Emergency Medicine [phone] Tamanna Bagley MD Internal Medicine [phone] Pradhan médico de atención primaria (PCP ) Primary Care Physician Office Phone Office Fax NONE ** None ** ** None ** Tiene alergias a lo siguiente No tiene alergias Documentación recientes Height Weight BMI (IMC) Estatus de tabaquísmo 1.524 m 64.3 kg 27.69 kg/m2 Former Smoker Emergency Contacts Name Discharge Info Relation Home Work Mobile Virgilio Tran  Other Relative [6] [phone] Patient Belongings The following personal items are in your possession at time of discharge: 
     Visual Aid: None             Clothing: With patient Please provide this summary of care documentation to your next provider. Signatures-by signing, you are acknowledging that this After Visit Summary has been reviewed with you and you have received a copy. Patient Signature:  ____________________________________________________________ Date:  ____________________________________________________________  
  
Lucio Madsen  Provider Signature: ____________________________________________________________ Date:  ____________________________________________________________

## 2018-05-20 ENCOUNTER — APPOINTMENT (OUTPATIENT)
Dept: GENERAL RADIOLOGY | Age: 73
DRG: 392 | End: 2018-05-20
Attending: INTERNAL MEDICINE
Payer: SELF-PAY

## 2018-05-20 PROBLEM — I10 HYPERTENSION: Status: ACTIVE | Noted: 2018-05-20

## 2018-05-20 PROBLEM — E87.1 ACUTE HYPONATREMIA: Status: ACTIVE | Noted: 2018-05-20

## 2018-05-20 PROBLEM — R11.2 NAUSEA & VOMITING: Status: ACTIVE | Noted: 2018-05-20

## 2018-05-20 LAB
ANION GAP SERPL CALC-SCNC: 12 MMOL/L (ref 7–16)
ANION GAP SERPL CALC-SCNC: 13 MMOL/L (ref 7–16)
ANION GAP SERPL CALC-SCNC: 8 MMOL/L (ref 7–16)
APPEARANCE UR: CLEAR
BACTERIA URNS QL MICRO: 0 /HPF
BILIRUB UR QL: NEGATIVE
BUN SERPL-MCNC: 15 MG/DL (ref 8–23)
BUN SERPL-MCNC: 16 MG/DL (ref 8–23)
BUN SERPL-MCNC: 19 MG/DL (ref 8–23)
CALCIUM SERPL-MCNC: 8.2 MG/DL (ref 8.3–10.4)
CALCIUM SERPL-MCNC: 8.4 MG/DL (ref 8.3–10.4)
CALCIUM SERPL-MCNC: 8.6 MG/DL (ref 8.3–10.4)
CHLORIDE SERPL-SCNC: 82 MMOL/L (ref 98–107)
CHLORIDE SERPL-SCNC: 86 MMOL/L (ref 98–107)
CHLORIDE SERPL-SCNC: 90 MMOL/L (ref 98–107)
CO2 SERPL-SCNC: 29 MMOL/L (ref 21–32)
CO2 SERPL-SCNC: 29 MMOL/L (ref 21–32)
CO2 SERPL-SCNC: 30 MMOL/L (ref 21–32)
COLOR UR: YELLOW
CREAT SERPL-MCNC: 1.26 MG/DL (ref 0.6–1)
CREAT SERPL-MCNC: 1.26 MG/DL (ref 0.6–1)
CREAT SERPL-MCNC: 1.31 MG/DL (ref 0.6–1)
EPI CELLS #/AREA URNS HPF: ABNORMAL /HPF
ERYTHROCYTE [DISTWIDTH] IN BLOOD BY AUTOMATED COUNT: 11.5 % (ref 11.9–14.6)
GLUCOSE SERPL-MCNC: 115 MG/DL (ref 65–100)
GLUCOSE SERPL-MCNC: 136 MG/DL (ref 65–100)
GLUCOSE SERPL-MCNC: 98 MG/DL (ref 65–100)
GLUCOSE UR STRIP.AUTO-MCNC: NEGATIVE MG/DL
HCT VFR BLD AUTO: 33 % (ref 35.8–46.3)
HGB BLD-MCNC: 11.8 G/DL (ref 11.7–15.4)
HGB UR QL STRIP: ABNORMAL
KETONES UR QL STRIP.AUTO: NEGATIVE MG/DL
LEUKOCYTE ESTERASE UR QL STRIP.AUTO: NEGATIVE
MCH RBC QN AUTO: 30 PG (ref 26.1–32.9)
MCHC RBC AUTO-ENTMCNC: 35.8 G/DL (ref 31.4–35)
MCV RBC AUTO: 84 FL (ref 79.6–97.8)
NITRITE UR QL STRIP.AUTO: NEGATIVE
OSMOLALITY SERPL: 239 MOSM/KG H2O (ref 280–301)
OSMOLALITY UR: 139 MOSM/KG H2O (ref 50–1400)
PH UR STRIP: 7 [PH] (ref 5–9)
PLATELET # BLD AUTO: 244 K/UL (ref 150–450)
PMV BLD AUTO: 10.1 FL (ref 10.8–14.1)
POTASSIUM SERPL-SCNC: 2.6 MMOL/L (ref 3.5–5.1)
POTASSIUM SERPL-SCNC: 3 MMOL/L (ref 3.5–5.1)
POTASSIUM SERPL-SCNC: 3.3 MMOL/L (ref 3.5–5.1)
PROT UR STRIP-MCNC: NEGATIVE MG/DL
RBC # BLD AUTO: 3.93 M/UL (ref 4.05–5.25)
RBC #/AREA URNS HPF: ABNORMAL /HPF
SODIUM SERPL-SCNC: 124 MMOL/L (ref 136–145)
SODIUM SERPL-SCNC: 127 MMOL/L (ref 136–145)
SODIUM SERPL-SCNC: 128 MMOL/L (ref 136–145)
SP GR UR REFRACTOMETRY: 1 (ref 1–1.02)
UROBILINOGEN UR QL STRIP.AUTO: 0.2 EU/DL (ref 0.2–1)
WBC # BLD AUTO: 9.5 K/UL (ref 4.3–11.1)
WBC URNS QL MICRO: ABNORMAL /HPF

## 2018-05-20 PROCEDURE — 85027 COMPLETE CBC AUTOMATED: CPT | Performed by: INTERNAL MEDICINE

## 2018-05-20 PROCEDURE — 83935 ASSAY OF URINE OSMOLALITY: CPT | Performed by: EMERGENCY MEDICINE

## 2018-05-20 PROCEDURE — 81001 URINALYSIS AUTO W/SCOPE: CPT | Performed by: EMERGENCY MEDICINE

## 2018-05-20 PROCEDURE — 74018 RADEX ABDOMEN 1 VIEW: CPT

## 2018-05-20 PROCEDURE — 83930 ASSAY OF BLOOD OSMOLALITY: CPT | Performed by: EMERGENCY MEDICINE

## 2018-05-20 PROCEDURE — 74011250636 HC RX REV CODE- 250/636: Performed by: EMERGENCY MEDICINE

## 2018-05-20 PROCEDURE — 65270000029 HC RM PRIVATE

## 2018-05-20 PROCEDURE — 77030005518 HC CATH URETH FOL 2W BARD -B

## 2018-05-20 PROCEDURE — 80048 BASIC METABOLIC PNL TOTAL CA: CPT | Performed by: FAMILY MEDICINE

## 2018-05-20 PROCEDURE — 74011250636 HC RX REV CODE- 250/636: Performed by: FAMILY MEDICINE

## 2018-05-20 PROCEDURE — 36415 COLL VENOUS BLD VENIPUNCTURE: CPT | Performed by: INTERNAL MEDICINE

## 2018-05-20 PROCEDURE — 74011250636 HC RX REV CODE- 250/636: Performed by: INTERNAL MEDICINE

## 2018-05-20 RX ORDER — POTASSIUM CHLORIDE 14.9 MG/ML
20 INJECTION INTRAVENOUS
Status: COMPLETED | OUTPATIENT
Start: 2018-05-20 | End: 2018-05-20

## 2018-05-20 RX ORDER — ONDANSETRON 2 MG/ML
4 INJECTION INTRAMUSCULAR; INTRAVENOUS
Status: DISCONTINUED | OUTPATIENT
Start: 2018-05-20 | End: 2018-05-21 | Stop reason: HOSPADM

## 2018-05-20 RX ORDER — HYDROCODONE BITARTRATE AND ACETAMINOPHEN 5; 325 MG/1; MG/1
1 TABLET ORAL
Status: DISCONTINUED | OUTPATIENT
Start: 2018-05-20 | End: 2018-05-21 | Stop reason: HOSPADM

## 2018-05-20 RX ORDER — SODIUM CHLORIDE 0.9 % (FLUSH) 0.9 %
5-10 SYRINGE (ML) INJECTION EVERY 8 HOURS
Status: DISCONTINUED | OUTPATIENT
Start: 2018-05-20 | End: 2018-05-21 | Stop reason: HOSPADM

## 2018-05-20 RX ORDER — ENOXAPARIN SODIUM 100 MG/ML
40 INJECTION SUBCUTANEOUS EVERY 24 HOURS
Status: DISCONTINUED | OUTPATIENT
Start: 2018-05-20 | End: 2018-05-21 | Stop reason: HOSPADM

## 2018-05-20 RX ORDER — SODIUM CHLORIDE 9 MG/ML
100 INJECTION, SOLUTION INTRAVENOUS CONTINUOUS
Status: DISCONTINUED | OUTPATIENT
Start: 2018-05-20 | End: 2018-05-20

## 2018-05-20 RX ORDER — ACETAMINOPHEN 325 MG/1
650 TABLET ORAL
Status: DISCONTINUED | OUTPATIENT
Start: 2018-05-20 | End: 2018-05-21 | Stop reason: HOSPADM

## 2018-05-20 RX ORDER — SODIUM CHLORIDE 0.9 % (FLUSH) 0.9 %
5-10 SYRINGE (ML) INJECTION AS NEEDED
Status: DISCONTINUED | OUTPATIENT
Start: 2018-05-20 | End: 2018-05-21 | Stop reason: HOSPADM

## 2018-05-20 RX ADMIN — Medication 5 ML: at 22:00

## 2018-05-20 RX ADMIN — POTASSIUM CHLORIDE 20 MEQ: 200 INJECTION, SOLUTION INTRAVENOUS at 13:50

## 2018-05-20 RX ADMIN — Medication 5 ML: at 14:00

## 2018-05-20 RX ADMIN — ONDANSETRON 4 MG: 2 INJECTION INTRAMUSCULAR; INTRAVENOUS at 19:58

## 2018-05-20 RX ADMIN — POTASSIUM CHLORIDE 20 MEQ: 200 INJECTION, SOLUTION INTRAVENOUS at 16:29

## 2018-05-20 RX ADMIN — SODIUM CHLORIDE 1000 ML: 900 INJECTION, SOLUTION INTRAVENOUS at 00:28

## 2018-05-20 RX ADMIN — SODIUM CHLORIDE 100 ML/HR: 900 INJECTION, SOLUTION INTRAVENOUS at 04:49

## 2018-05-20 RX ADMIN — Medication 5 ML: at 05:09

## 2018-05-20 NOTE — PROGRESS NOTES
Problem: Pressure Injury - Risk of  Goal: *Prevention of pressure injury  Document Gonsalo Scale and appropriate interventions in the flowsheet.    Outcome: Progressing Towards Goal  Pressure Injury Interventions:  Sensory Interventions: Assess need for specialty bed    Moisture Interventions: Absorbent underpads    Activity Interventions: Increase time out of bed    Mobility Interventions: HOB 30 degrees or less, Pressure redistribution bed/mattress (bed type)    Nutrition Interventions: Document food/fluid/supplement intake    Friction and Shear Interventions: HOB 30 degrees or less, Feet elevated on foot rest

## 2018-05-20 NOTE — H&P
HOSPITALIST H&P      NAME:  Tia Hager   Age:  81 y.o.  :   1945   MRN:   [id number]    PCP: None    Attending MD: Arlyn Nova MD    Treatment Team: Attending Provider: Kassy Márquez MD; Primary Nurse: Amrita Laura    HPI:     Tia Hager is a 73yoF with HTN who presented to [facility] ED with n/v x 1 week. Associated with some mild abd cramping in lower quadrants and constipation (now relieved after taking laxatives). She has not been able to keep down food for a few days. No fever/chills, CP, SOB. She and her family are Samoan speaking, so hx obtained through an . In the ED, she was found to have Na of 116. No new meds. Only takes a BP med (probably enalapril, but dose unknown) and albuterol. Hospitalist asked to admit for hyponatremia. Complete ROS done and is as stated in HPI or otherwise negative    Past Medical History:   Diagnosis Date    Asthma     Hypertension         Past Surgical History:   Procedure Laterality Date    HX HYSTERECTOMY          None       No Known Allergies     Social History   Substance Use Topics    Smoking status: Former Smoker    Smokeless tobacco: Never Used    Alcohol use Yes      Comment: very rarely        History reviewed. No pertinent family history. Objective:       Visit Vitals    /90 (BP Patient Position: At rest)    Pulse 72    Temp 98.1 °F (36.7 °C)    Resp 16    SpO2 98%        Temp (24hrs), Av.1 °F (36.7 °C), Min:98.1 °F (36.7 °C), Max:98.1 °F (36.7 °C)      Oxygen Therapy  O2 Sat (%): 98 % (18)  O2 Device: Room air (18)      Physical Exam:      General:    Alert, cooperative, NAD  Eyes:   PERRL EOMI Anicteric  Head:   Normocephalic, without obvious abnormality, atraumatic. ENT:  Nares normal. Dry MM  Lungs:   Clear. No Wheezing. Normal resp effort on RA  Heart:   Regular rate and rhythm,  No BLE edemas  Abdomen:   Soft, ND/NTTP, +NABS  MSK:  Spontaneously moves extremities.  No deformities/lesions. Skin:     Texture, turgor normal. No rashes or lesions. Not Jaundiced. Neurologic: No focal deficits   Psychiatry:      No depression/anxiety. Mood congruent for context. Heme/Lymph/Immune: No petechiae, echymoses, overt signs of bleeding or lymphadenopathy. Data Review:   Recent Results (from the past 24 hour(s))   CBC WITH AUTOMATED DIFF    Collection Time: 05/19/18 11:19 PM   Result Value Ref Range    WBC 11.2 (H) 4.3 - 11.1 K/uL    RBC 4.12 4.05 - 5.25 M/uL    HGB 12.8 11.7 - 15.4 g/dL    HCT 33.8 (L) 35.8 - 46.3 %    MCV 82.0 79.6 - 97.8 FL    MCH 31.1 26.1 - 32.9 PG    MCHC 37.9 (H) 31.4 - 35.0 g/dL    RDW 11.8 (L) 11.9 - 14.6 %    PLATELET 900 440 - 598 K/uL    MPV 9.9 (L) 10.8 - 14.1 FL    DF AUTOMATED      NEUTROPHILS 60 43 - 78 %    LYMPHOCYTES 24 13 - 44 %    MONOCYTES 10 4.0 - 12.0 %    EOSINOPHILS 5 0.5 - 7.8 %    BASOPHILS 0 0.0 - 2.0 %    IMMATURE GRANULOCYTES 1 0.0 - 5.0 %    ABS. NEUTROPHILS 6.7 1.7 - 8.2 K/UL    ABS. LYMPHOCYTES 2.7 0.5 - 4.6 K/UL    ABS. MONOCYTES 1.1 0.1 - 1.3 K/UL    ABS. EOSINOPHILS 0.5 0.0 - 0.8 K/UL    ABS. BASOPHILS 0.0 0.0 - 0.2 K/UL    ABS. IMM. GRANS. 0.1 0.0 - 0.5 K/UL   METABOLIC PANEL, COMPREHENSIVE    Collection Time: 05/19/18 11:19 PM   Result Value Ref Range    Sodium 116 (LL) 136 - 145 mmol/L    Potassium 4.0 3.5 - 5.1 mmol/L    Chloride 72 (L) 98 - 107 mmol/L    CO2 31 21 - 32 mmol/L    Anion gap 13 7 - 16 mmol/L    Glucose 96 65 - 100 mg/dL    BUN 19 8 - 23 MG/DL    Creatinine 1.23 (H) 0.6 - 1.0 MG/DL    GFR est AA 56 (L) >60 ml/min/1.73m2    GFR est non-AA 46 (L) >60 ml/min/1.73m2    Calcium 9.2 8.3 - 10.4 MG/DL    Bilirubin, total 1.0 0.2 - 1.1 MG/DL    ALT (SGPT) 27 12 - 65 U/L    AST (SGOT) 47 (H) 15 - 37 U/L    Alk.  phosphatase 85 50 - 136 U/L    Protein, total 8.3 (H) 6.3 - 8.2 g/dL    Albumin 3.9 3.2 - 4.6 g/dL    Globulin 4.4 (H) 2.3 - 3.5 g/dL    A-G Ratio 0.9 (L) 1.2 - 3.5     OSMOLALITY, SERUM/PLASMA    Collection Time: 05/20/18 12:26 AM   Result Value Ref Range    Osmolality, serum/plasma 239 (L) 280 - 301 MOSM/kg H2O       Imaging /Procedures /Studies   XR ABD (KUB)    (Results Pending)       Assessment and Plan:        Active Hospital Problems    Diagnosis Date Noted    Acute hyponatremia 05/20/2018    Hypertension 05/20/2018    Nausea & vomiting 05/20/2018       PLAN  - Hyponatremia:  No previous labs, but likely acute  Check Joselito, UOsm, SOsm  Likely prerenal  Start IVF  Check Na q6h and adjust IVF as needed    -  N/V:  Unclear etiology, but suspect viral gastroenteritis  Check KUB to r/o obstruction  Symptomatic tx with IVF and anti-emetics  If not improved, may need to start abx    - HTN:  Unsure about patient's home med  Will need to confirm with pharmacy in AM before starting    Code Status: FULL  DVT Prophylaxis: lovenox    Anticipated discharge: 2-3 days      Toshia Allen MD  12:45 AM

## 2018-05-20 NOTE — PROGRESS NOTES
Problem: Pressure Injury - Risk of  Goal: *Prevention of pressure injury  Document Gonsalo Scale and appropriate interventions in the flowsheet. Outcome: Progressing Towards Goal  Pressure Injury Interventions:  Sensory Interventions: Assess need for specialty bed    Moisture Interventions: Absorbent underpads    Activity Interventions: Increase time out of bed, Pressure redistribution bed/mattress(bed type), PT/OT evaluation    Mobility Interventions: HOB 30 degrees or less, Assess need for specialty bed, Pressure redistribution bed/mattress (bed type)    Nutrition Interventions: Document food/fluid/supplement intake, Discuss nutritional consult with provider, Offer support with meals,snacks and hydration    Friction and Shear Interventions: HOB 30 degrees or less, Feet elevated on foot rest               Problem: Falls - Risk of  Goal: *Absence of Falls  Document Carol Fall Risk and appropriate interventions in the flowsheet.    Outcome: Progressing Towards Goal  Fall Risk Interventions:  Mobility Interventions: Assess mobility with egress test, OT consult for ADLs, Strengthening exercises (ROM-active/passive), Patient to call before getting OOB, PT Consult for mobility concerns, PT Consult for assist device competence, Communicate number of staff needed for ambulation/transfer         Medication Interventions: Evaluate medications/consider consulting pharmacy    Elimination Interventions: Patient to call for help with toileting needs, Call light in reach    History of Falls Interventions: Bed/chair exit alarm, Investigate reason for fall, Consult care management for discharge planning, Room close to nurse's station, Door open when patient unattended, Evaluate medications/consider consulting pharmacy

## 2018-05-20 NOTE — PROGRESS NOTES
Patient stable and resting in bed. Denies any abdominal pain. C/o nausea upon arrival but has subsided. IV fluid continuously.  Will provide SBAR to oncoming RN,

## 2018-05-20 NOTE — PROGRESS NOTES
Full code with no directives on file  LANGUAGE - [place]ican  BIRTHDAY [birth year]  [employer]udd[employer] sal  Relative - Sunny Lucas, staff Laurel [address] 59, [address]  /   [email or web address]

## 2018-05-20 NOTE — PROGRESS NOTES
Patient is resting in bed with family at bedside. Call light within reach and patient instructed to call if assistance is needed.   Report to be given to oncoming RN 7p-7a

## 2018-05-20 NOTE — PROGRESS NOTES
Problem: Pressure Injury - Risk of  Goal: *Prevention of pressure injury  Document Gonsalo Scale and appropriate interventions in the flowsheet.    Outcome: Progressing Towards Goal  Pressure Injury Interventions:  Sensory Interventions: Assess need for specialty bed    Moisture Interventions: Absorbent underpads    Activity Interventions: Pressure redistribution bed/mattress(bed type)    Mobility Interventions: HOB 30 degrees or less, Pressure redistribution bed/mattress (bed type)    Nutrition Interventions: Discuss nutritional consult with provider, Document food/fluid/supplement intake, Offer support with meals,snacks and hydration    Friction and Shear Interventions: HOB 30 degrees or less, Feet elevated on foot rest

## 2018-05-20 NOTE — PROGRESS NOTES
Patient resting in bed with no complaints at this time. Patient is alert and orientated with no distress noted. IV intact and patent with no s./s of infection noted. Respirations even and unlabored with heart rate regular. Patient unable to ambulate independently without assistance; needs x1. Bed in low locked position with call light within reach. Patient instructed to call if assistance is needed. Will continue to monitor.

## 2018-05-20 NOTE — ED NOTES
TRANSFER - OUT REPORT:    Verbal report given to genevieve WARREN on Onel Prasanna  being transferred to 806(unit) for routine progression of care       Report consisted of patients Situation, Background, Assessment and   Recommendations(SBAR). Information from the following report(s) SBAR, ED Summary, Intake/Output and MAR was reviewed with the receiving nurse. Lines:   Peripheral IV 05/20/18 Left Antecubital (Active)   Site Assessment Clean, dry, & intact 5/20/2018 12:27 AM   Phlebitis Assessment 0 5/20/2018 12:27 AM   Infiltration Assessment 0 5/20/2018 12:27 AM   Dressing Status Clean, dry, & intact 5/20/2018 12:27 AM   Dressing Type Transparent 5/20/2018 12:27 AM        Opportunity for questions and clarification was provided.       Patient transported with:   [employer]

## 2018-05-20 NOTE — PROGRESS NOTES
Problem: Falls - Risk of  Goal: *Absence of Falls  Document Carol Fall Risk and appropriate interventions in the flowsheet.    Outcome: Progressing Towards Goal  Fall Risk Interventions:  Mobility Interventions: Bed/chair exit alarm, Patient to call before getting OOB         Medication Interventions: Evaluate medications/consider consulting pharmacy, Patient to call before getting OOB, Bed/chair exit alarm    Elimination Interventions: Patient to call for help with toileting needs, Call light in reach    History of Falls Interventions: Bed/chair exit alarm, Evaluate medications/consider consulting pharmacy

## 2018-05-20 NOTE — ED PROVIDER NOTES
Patient is a 81 y.o. female presenting with abdominal pain. The history is provided by the patient and a relative. A  was used. Abdominal Pain    This is a new problem. The current episode started more than 2 days ago. The problem occurs daily. The problem has not changed since onset. The pain is associated with vomiting (Diarrhea, not constipation). The pain is located in the generalized abdominal region (Bloated sensation). The quality of the pain is aching. The pain is mild. Associated symptoms include diarrhea, nausea and vomiting. Pertinent negatives include no fever, no melena, no constipation, no dysuria, no frequency, no headaches, no arthralgias, no chest pain and no back pain. The pain is worsened by eating. The pain is relieved by nothing. Her past medical history does not include DM. The patient's surgical history non-contributory. Past Medical History:   Diagnosis Date    Asthma     Hypertension        Past Surgical History:   Procedure Laterality Date    HX HYSTERECTOMY           History reviewed. No pertinent family history. Social History     Social History    Marital status:      Spouse name: N/A    Number of children: N/A    Years of education: N/A     Occupational History    Not on file. Social History Main Topics    Smoking status: Former Smoker    Smokeless tobacco: Never Used    Alcohol use Yes      Comment: very rarely    Drug use: No    Sexual activity: Not on file     Other Topics Concern    Not on file     Social History Narrative         ALLERGIES: Review of patient's allergies indicates no known allergies. Review of Systems   Constitutional: Positive for fatigue. Negative for chills and fever. HENT: Negative for congestion, rhinorrhea and sore throat. Eyes: Negative for discharge and redness. Respiratory: Negative for cough and shortness of breath. Cardiovascular: Negative for chest pain.    Gastrointestinal: Positive for abdominal distention, abdominal pain, diarrhea, nausea and vomiting. Negative for constipation and melena. Genitourinary: Positive for decreased urine volume. Negative for difficulty urinating, dysuria and frequency. Musculoskeletal: Negative for arthralgias and back pain. Skin: Negative for rash. Neurological: Negative for dizziness, light-headedness and headaches. All other systems reviewed and are negative. Vitals:    05/19/18 2309 05/20/18 0000 05/20/18 0040 05/20/18 0205   BP: 164/90 157/85 172/89 (!) 174/92   Pulse: 72  65 65   Resp: 16 16 16 18   Temp: 98.1 °F (36.7 °C)      SpO2: 98%  100% 98%            Physical Exam   Constitutional: She is oriented to person, place, and time. She appears well-developed and well-nourished. No distress. HENT:   Head: Normocephalic and atraumatic. Eyes: Conjunctivae are normal. Pupils are equal, round, and reactive to light. Right eye exhibits no discharge. Left eye exhibits no discharge. No scleral icterus. Neck: Normal range of motion. Neck supple. Cardiovascular: Normal rate, regular rhythm and normal heart sounds. Exam reveals no gallop. No murmur heard. Pulmonary/Chest: Effort normal and breath sounds normal. No respiratory distress. She has no wheezes. She has no rales. Abdominal: Soft. Bowel sounds are decreased. There is no tenderness. There is no guarding. Musculoskeletal: Normal range of motion. She exhibits no edema. Neurological: She is alert and oriented to person, place, and time. She exhibits normal muscle tone. cni 2-12 grossly   Skin: Skin is warm and dry. She is not diaphoretic. Psychiatric: She has a normal mood and affect. Her behavior is normal.   Nursing note and vitals reviewed. MDM  Number of Diagnoses or Management Options  Hyponatremia:   Diagnosis management comments: Medical decision making note:  Patient takes baby aspirin daily, enalapril for hypertension, and prn albuterol inhaler for asthma.   Patient can't keep anything down, reports vomiting everytime she eats for the last 2 days. Estimates 3 or 4 spells of vomiting per day   2- 3 spells of diarrhea per day  Severe hypernatremia at 117. Patient clinically looks okay  Start saline at 250 per hour  Admit hospitalist service,  This concludes the \"medical decision making note\" part of this emergency department visit note.           ED Course       Procedures

## 2018-05-20 NOTE — PROGRESS NOTES
Hospitalist Progress Note    2018  Admit Date: 2018 11:32 PM   NAME: Ar Moreno   :  1945   MRN:  [id number]   Attending: Deshawn Balderas MD  PCP:  None    SUBJECTIVE:     Ar Morneo is a Urdu speaking 73yoF with HTN who presented to [facility] ED with n/v x 1 week. Associated with some mild abd cramping in lower quadrants and constipation (now relieved after taking laxatives). She has not been able to keep down food for a few days. No fever/chills, CP, SOB. In the ED, she was found to have Na of 116. No new meds. Only takes a BP med (probably enalapril, but dose unknown) and albuterol. Hospitalist asked to admit for hyponatremia. Today: she denies abd pain or emesis since admission. She is tolerating diet and has been on NS for hyponatremia. Delayed lab collection for BMP now reveals       Review of Systems negative with exception of pertinent positives noted above      PHYSICAL EXAM       Visit Vitals    BP 97/47    Pulse 81    Temp 98.2 °F (36.8 °C)    Resp 17    Wt 64.3 kg (141 lb 12.8 oz)    SpO2 100%      Temp (24hrs), Av.9 °F (36.6 °C), Min:97.4 °F (36.3 °C), Max:98.2 °F (36.8 °C)    Oxygen Therapy  O2 Sat (%): 100 % (18 1117)  Pulse via Oximetry: 65 beats per minute (18 0205)  O2 Device: Room air (18 0205)    Intake/Output Summary (Last 24 hours) at 18 1328  Last data filed at 18 0630   Gross per 24 hour   Intake                0 ml   Output             1600 ml   Net            -1600 ml          General: No acute distress. Head:  Atraumatic Normocephalic. Eyes:  PERRLA, EOMI, Anicteric. ENT:  No discharges/lesions. Lungs:  CTA Bilaterally. CVS:  Regular rate and rhythm,  No murmur, rub, or gallop, No JVD, No lower   extremity edema. Abdomen: Obese, Soft, Non distended, Non tender, Positive bowel sounds. MSK:  No deformities, lesions, Spontaneously moves extremities. Neurologic:  AOx3.  No focal deficits  Psychiatry:      No anxiety/Depression  Skin:   No rash/lesions. Good skin turgor  Heme/Lymph/Immune:  No petechiae, ecchymoses, overt signs of bleeding or    lymphadenopathy noted. Recent Results (from the past 24 hour(s))   CBC WITH AUTOMATED DIFF    Collection Time: 05/19/18 11:19 PM   Result Value Ref Range    WBC 11.2 (H) 4.3 - 11.1 K/uL    RBC 4.12 4.05 - 5.25 M/uL    HGB 12.8 11.7 - 15.4 g/dL    HCT 33.8 (L) 35.8 - 46.3 %    MCV 82.0 79.6 - 97.8 FL    MCH 31.1 26.1 - 32.9 PG    MCHC 37.9 (H) 31.4 - 35.0 g/dL    RDW 11.8 (L) 11.9 - 14.6 %    PLATELET 395 940 - 267 K/uL    MPV 9.9 (L) 10.8 - 14.1 FL    DF AUTOMATED      NEUTROPHILS 60 43 - 78 %    LYMPHOCYTES 24 13 - 44 %    MONOCYTES 10 4.0 - 12.0 %    EOSINOPHILS 5 0.5 - 7.8 %    BASOPHILS 0 0.0 - 2.0 %    IMMATURE GRANULOCYTES 1 0.0 - 5.0 %    ABS. NEUTROPHILS 6.7 1.7 - 8.2 K/UL    ABS. LYMPHOCYTES 2.7 0.5 - 4.6 K/UL    ABS. MONOCYTES 1.1 0.1 - 1.3 K/UL    ABS. EOSINOPHILS 0.5 0.0 - 0.8 K/UL    ABS. BASOPHILS 0.0 0.0 - 0.2 K/UL    ABS. IMM. GRANS. 0.1 0.0 - 0.5 K/UL   METABOLIC PANEL, COMPREHENSIVE    Collection Time: 05/19/18 11:19 PM   Result Value Ref Range    Sodium 116 (LL) 136 - 145 mmol/L    Potassium 4.0 3.5 - 5.1 mmol/L    Chloride 72 (L) 98 - 107 mmol/L    CO2 31 21 - 32 mmol/L    Anion gap 13 7 - 16 mmol/L    Glucose 96 65 - 100 mg/dL    BUN 19 8 - 23 MG/DL    Creatinine 1.23 (H) 0.6 - 1.0 MG/DL    GFR est AA 56 (L) >60 ml/min/1.73m2    GFR est non-AA 46 (L) >60 ml/min/1.73m2    Calcium 9.2 8.3 - 10.4 MG/DL    Bilirubin, total 1.0 0.2 - 1.1 MG/DL    ALT (SGPT) 27 12 - 65 U/L    AST (SGOT) 47 (H) 15 - 37 U/L    Alk.  phosphatase 85 50 - 136 U/L    Protein, total 8.3 (H) 6.3 - 8.2 g/dL    Albumin 3.9 3.2 - 4.6 g/dL    Globulin 4.4 (H) 2.3 - 3.5 g/dL    A-G Ratio 0.9 (L) 1.2 - 3.5     OSMOLALITY, SERUM/PLASMA    Collection Time: 05/20/18 12:26 AM   Result Value Ref Range    Osmolality, serum/plasma 239 (L) 280 - 301 MOSM/kg H2O   URINALYSIS W/ RFLX MICROSCOPIC Collection Time: 05/20/18  2:00 AM   Result Value Ref Range    Color YELLOW      Appearance CLEAR      Specific gravity 1.005 1.001 - 1.023      pH (UA) 7.0 5.0 - 9.0      Protein NEGATIVE  NEG mg/dL    Glucose NEGATIVE  mg/dL    Ketone NEGATIVE  NEG mg/dL    Bilirubin NEGATIVE  NEG      Blood TRACE (A) NEG      Urobilinogen 0.2 0.2 - 1.0 EU/dL    Nitrites NEGATIVE  NEG      Leukocyte Esterase NEGATIVE  NEG      WBC 0-3 0 /hpf    RBC 0-3 0 /hpf    Epithelial cells 0-3 0 /hpf    Bacteria 0 0 /hpf   OSMOLALITY, UR    Collection Time: 05/20/18  2:00 AM   Result Value Ref Range    Osmolality,urine 139 50 - 1400 MOSM/kg H2O   CBC W/O DIFF    Collection Time: 05/20/18 10:13 AM   Result Value Ref Range    WBC 9.5 4.3 - 11.1 K/uL    RBC 3.93 (L) 4.05 - 5.25 M/uL    HGB 11.8 11.7 - 15.4 g/dL    HCT 33.0 (L) 35.8 - 46.3 %    MCV 84.0 79.6 - 97.8 FL    MCH 30.0 26.1 - 32.9 PG    MCHC 35.8 (H) 31.4 - 35.0 g/dL    RDW 11.5 (L) 11.9 - 14.6 %    PLATELET 234 121 - 734 K/uL    MPV 10.1 (L) 10.8 - 08.0 FL   METABOLIC PANEL, BASIC    Collection Time: 05/20/18 10:13 AM   Result Value Ref Range    Sodium 124 (LL) 136 - 145 mmol/L    Potassium 2.6 (LL) 3.5 - 5.1 mmol/L    Chloride 82 (L) 98 - 107 mmol/L    CO2 29 21 - 32 mmol/L    Anion gap 13 7 - 16 mmol/L    Glucose 136 (H) 65 - 100 mg/dL    BUN 15 8 - 23 MG/DL    Creatinine 1.26 (H) 0.6 - 1.0 MG/DL    GFR est AA 54 (L) >60 ml/min/1.73m2    GFR est non-AA 45 (L) >60 ml/min/1.73m2    Calcium 8.6 8.3 - 10.4 MG/DL         Imaging /Procedures [address] Problems as of 5/20/2018  Date Reviewed: 5/20/2018          Codes Class Noted - Resolved POA    * (Principal)Acute hyponatremia ICD-10-CM: E87.1  ICD-9-CM: 276.1  5/20/2018 - Present Unknown        Hypertension ICD-10-CM: I10  ICD-9-CM: 401.9  5/20/2018 - Present Yes        Nausea & vomiting ICD-10-CM: R11.2  ICD-9-CM: 787.01  5/20/2018 - Present Yes                  Plan:  - Hypovolemic hyponatremia  - Hold NS and follow up BMP q4 hrs.    - Replace K and monitor  - Likely a resolving viral gastroenteritis since no emesis since admission.   - If abd pain develops; will consider further imaging  - CHRISTIAN:Continue holding home Enalapril       Hans Jurado MD

## 2018-05-21 VITALS
OXYGEN SATURATION: 96 % | HEIGHT: 60 IN | BODY MASS INDEX: 27.84 KG/M2 | DIASTOLIC BLOOD PRESSURE: 74 MMHG | TEMPERATURE: 98.2 F | RESPIRATION RATE: 18 BRPM | HEART RATE: 66 BPM | WEIGHT: 141.8 LBS | SYSTOLIC BLOOD PRESSURE: 113 MMHG

## 2018-05-21 LAB
ANION GAP SERPL CALC-SCNC: 10 MMOL/L (ref 7–16)
ANION GAP SERPL CALC-SCNC: 10 MMOL/L (ref 7–16)
BASOPHILS # BLD: 0.1 K/UL (ref 0–0.2)
BASOPHILS NFR BLD: 1 % (ref 0–2)
BUN SERPL-MCNC: 19 MG/DL (ref 8–23)
BUN SERPL-MCNC: 19 MG/DL (ref 8–23)
CALCIUM SERPL-MCNC: 8.7 MG/DL (ref 8.3–10.4)
CALCIUM SERPL-MCNC: 9.1 MG/DL (ref 8.3–10.4)
CHLORIDE SERPL-SCNC: 92 MMOL/L (ref 98–107)
CHLORIDE SERPL-SCNC: 94 MMOL/L (ref 98–107)
CO2 SERPL-SCNC: 29 MMOL/L (ref 21–32)
CO2 SERPL-SCNC: 29 MMOL/L (ref 21–32)
CREAT SERPL-MCNC: 1.24 MG/DL (ref 0.6–1)
CREAT SERPL-MCNC: 1.3 MG/DL (ref 0.6–1)
DIFFERENTIAL METHOD BLD: ABNORMAL
EOSINOPHIL # BLD: 0.6 K/UL (ref 0–0.8)
EOSINOPHIL NFR BLD: 8 % (ref 0.5–7.8)
ERYTHROCYTE [DISTWIDTH] IN BLOOD BY AUTOMATED COUNT: 12.1 % (ref 11.9–14.6)
GLUCOSE SERPL-MCNC: 102 MG/DL (ref 65–100)
GLUCOSE SERPL-MCNC: 87 MG/DL (ref 65–100)
HCT VFR BLD AUTO: 33.2 % (ref 35.8–46.3)
HGB BLD-MCNC: 11.7 G/DL (ref 11.7–15.4)
IMM GRANULOCYTES # BLD: 0.1 K/UL (ref 0–0.5)
IMM GRANULOCYTES NFR BLD AUTO: 1 % (ref 0–5)
LYMPHOCYTES # BLD: 2.2 K/UL (ref 0.5–4.6)
LYMPHOCYTES NFR BLD: 29 % (ref 13–44)
MCH RBC QN AUTO: 30 PG (ref 26.1–32.9)
MCHC RBC AUTO-ENTMCNC: 35.2 G/DL (ref 31.4–35)
MCV RBC AUTO: 85.1 FL (ref 79.6–97.8)
MONOCYTES # BLD: 0.7 K/UL (ref 0.1–1.3)
MONOCYTES NFR BLD: 9 % (ref 4–12)
NEUTS SEG # BLD: 3.9 K/UL (ref 1.7–8.2)
NEUTS SEG NFR BLD: 52 % (ref 43–78)
PLATELET # BLD AUTO: 274 K/UL (ref 150–450)
PMV BLD AUTO: 9.5 FL (ref 10.8–14.1)
POTASSIUM SERPL-SCNC: 3.2 MMOL/L (ref 3.5–5.1)
POTASSIUM SERPL-SCNC: 3.3 MMOL/L (ref 3.5–5.1)
RBC # BLD AUTO: 3.9 M/UL (ref 4.05–5.25)
SODIUM SERPL-SCNC: 131 MMOL/L (ref 136–145)
SODIUM SERPL-SCNC: 133 MMOL/L (ref 136–145)
WBC # BLD AUTO: 7.5 K/UL (ref 4.3–11.1)

## 2018-05-21 PROCEDURE — 36415 COLL VENOUS BLD VENIPUNCTURE: CPT | Performed by: FAMILY MEDICINE

## 2018-05-21 PROCEDURE — 74011250636 HC RX REV CODE- 250/636: Performed by: INTERNAL MEDICINE

## 2018-05-21 PROCEDURE — 85025 COMPLETE CBC W/AUTO DIFF WBC: CPT | Performed by: FAMILY MEDICINE

## 2018-05-21 PROCEDURE — 80048 BASIC METABOLIC PNL TOTAL CA: CPT | Performed by: FAMILY MEDICINE

## 2018-05-21 RX ADMIN — ENOXAPARIN SODIUM 40 MG: 40 INJECTION SUBCUTANEOUS at 05:56

## 2018-05-21 RX ADMIN — Medication 5 ML: at 05:57

## 2018-05-21 NOTE — DISCHARGE INSTRUCTIONS
Presión arterial disha: Instrucciones de cuidado - [ High Blood Pressure: Care Instructions ]  Instrucciones de cuidado    Si varghese presión arterial suele ser superior a 140/90, usted tiene presión arterial disha o hipertensión. [place] significa que el número de [place] es 140 o superior o que el número de abajo es 90 o superior, o ambas cosas. A pesar de lo que mucha gente zuly, la hipertensión no suele causar dolor de susanna ni provocar mareos o aturdimiento. Generalmente, no presenta síntomas. Sin embargo, aumenta el riesgo de ataque al corazón, ataque cerebral, y daños en los riñones o en los ojos. A mayor presión arterial, mayor riesgo. Varghese médico le dará quoc meta para varghese presión arterial. Varghese meta se basará en varghese alice y varghese edad. Un ejemplo de meta es mantener varghese presión arterial por debajo de 140/90. Los cambios de estilo de tanya, demetrio alimentarse en forma saludable y [place], siempre son importantes para ayudarle a bajar la presión arterial. También podría violetta medicamentos para lograr varghese meta para la presión arterial.  La atención de seguimiento es quoc parte clave de varghese tratamiento y seguridad. Asegúrese de hacer y acudir a todas las citas, y llame a varghese médico si está teniendo problemas. También es quoc buena idea saber los resultados de los exámenes y mantener quoc lista de los medicamentos que racheal. ¿Cómo puede cuidarse en el hogar? Tratamiento médico  · Si choco de violetta molly medicamentos, varghese presión arterial volverá a subir. Es posible que deba violetta un tipo de [place], o más de [place], para reducir la presión arterial. Sea sil con los medicamentos. [place] varghese medicamento exactamente demetrio se lo hayan indicado. Llame a varghese médico si zuly estar teniendo problemas con varghese medicamento. · Hable con varghese médico antes de empezar a violetta [facility].  La aspirina puede ayudar a determinadas personas a reducir varghese riesgo de tener un ataque cardíaco o un ataque cerebral. [place] violetta aspirina no es adecuado para todo [address], debido a que puede causar sangrado grave. · Visite a varghese médico periódicamente. Usted podría necesitar consultar a varghese médico con más frecuencia al principio o hasta que se reduzca varghese presión arterial.  · Si usted está tomando medicamentos para la presión arterial, hable con varghese médico antes de violetta medicamentos descongestionantes o antiinflamatorios, demetrio ibuprofeno. Algunos de estos medicamentos pueden elevar la presión arterial.  · Aprenda a revisarse la presión arterial en varghese hogar. Cambios en el estilo de tanya  · Mantenga un peso saludable. [place] es particularmente importante si aumenta de peso en la tony de la cintura. Bajar solo 10 libras (4.5 kg) puede ayudarle a reducir varghese presión arterial.  · Ana más ejercicios si varghese médico se lo recomienda. Caminar es quoc buena opción. Poco a poco, aumente la distancia que [employer]. Intente hacer por lo menos 30 minutos de ejercicio la mayoría de los días de la [place]. También podría nadar, montar en bicicleta o hacer otras actividades. · No tome alcohol o limite la cantidad que christine. Hable con varghese médico acerca de si puede violetta alcohol. · Trate de limitar la cantidad de sodio que consume a menos de 2,300 miligramos (mg) al día. Varghese médico podría pedirle que trate de consumir menos de 1,500 mg al día. · Coma abundantes frutas (demetrio bananas [plátanos] y naranjas), verduras, legumbres, granos integrales y productos lácteos de bajo contenido de [place]. · Reduzca la cantidad de grasas saturadas en varghese dieta. Los productos derivados de los [place], demetrio la [employer], el queso y la carne, contienen grasas saturadas. El limitar estos alimentos podría ayudarle a bajar de peso y [place] reducir varghese riesgo de quoc enfermedad cardíaca. · No fume. El hábito de fumar aumenta varghese riesgo de ataque cerebral y ataque al corazón. Si necesita ayuda para dejar de fumar, hable con varghese médico sobre programas y medicamentos para dejar de fumar.  Estos pueden aumentar molly probabilidades de dejar el hábito para siempre. ¿Cuándo debe pedir ayuda? Llame al 911 en cualquier momento que considere que necesita atención de [place]. [place] puede significar que tiene síntomas que sugieren que varghese presión arterial le está causando un problema cardíaco o vascular grave. Varghese presión arterial podría ser superior a 180/110. ? Por ejemplo, llame al 911 si:  ? · Tiene síntomas de un ataque al corazón. Estos pueden incluir:  ¨ Dolor o presión en el pecho, o quoc sensación extraña en el pecho. ¨ Sudoración. ¨ Falta de aire. ¨ Náuseas o vómito. ¨ Dolor, presión o quoc sensación extraña en la espalda, el griselda, la mandíbula, la parte superior del abdomen o en martina o ambos hombros o brazos. ¨ Aturdimiento o debilidad repentina. ¨ Latidos del corazón rápidos o irregulares. ? · Tiene síntomas de un ataque cerebral. Estos pueden incluir:  ¨ Entumecimiento, hormigueo, debilidad o parálisis repentinos en la vonnie, el brazo o la pierna, sobre todo en un solo lado del cuerpo. ¨ Cambios repentinos en la visión. ¨ Dificultades repentinas para hablar. ¨ Confusión repentina o dificultad súbita para comprender frases sencillas. ¨ Problemas repentinos para caminar o mantener el equilibrio. ¨ Dolor de [place] intenso y repentino, distinto de los darlyn de susanna anteriores. ? · Tiene un dolor intenso en la espalda o el abdomen. ? No espere a que la presión arterial baje por sí ana laura. Obtenga ayuda de inmediato. ? Llame a varghese médico ahora mismo o busque atención de inmediato si:  ? · Tiene la presión arterial mucho más disha de lo normal (demetrio 180/110 o más disha), bulmaro no tiene síntomas. ? · Piensa que la presión arterial disha le está provocando síntomas, demetrio:  ¨ Dolor de susanna intenso. [address] ? Vigile de cerca los [place], y asegúrese de comunicarse con varghese médico si:  ? · Varghese presión arterial mide 140/90 o más en al menos 2 ocasiones.  [place] significa que el número de arriba es 140 o superior o el número de abajo es 90 o superior, o ambas cosas. ? · Amber que podría estar teniendo efectos secundarios de los medicamentos para la presión arterial.   ? · Pradhan presión arterial suele ser normal, bulmaro se eleva por encima de lo normal en al menos 2 ocasiones. ¿Dónde puede encontrar más información en inglés? Clyde Anderson a http://sebastian-simeon.info/. Rajeev Searsache [id number] en la búsqueda para aprender más acerca de \"Presión arterial disha: Instrucciones de cuidado - [ High Blood Pressure: Care Instructions ]. \"  Revisado: 21 septiembre, 2016  Versión del contenido: 11.4  © 0408-4402 Healthwise, Incorporated. Las instrucciones de cuidado fueron adaptadas bajo licencia por [employer] (which disclaims liability or warranty for this information). Si usted tiene [place] afección médica o sobre estas instrucciones, siempre pregunte a pradhan profesional de alice. Healthwise, Incorporated niega toda garantía o responsabilidad por pradhan uso de esta información. Hiponatremia: Instrucciones de cuidado - [ Hyponatremia: Care Instructions ]  Instrucciones de cuidado    Hiponatremia significa que no tiene suficiente sodio en la stephanie. Puede causar náuseas, vómitos y dolor de [place]. O puede que usted no tenga apetito. En casos graves, puede causar convulsiones, coma o incluso la [place]. La hiponatremia no es quoc enfermedad. Es un problema causado por algo más, demetrio medicamentos o hacer ejercicio por mucho tiempo cuando hace calor. Puede desarrollar hiponatremia si pierde mucho líquido y luego christine grandes cantidades de agua u otros líquidos que no contienen mucho sodio. También puede desarrollarla si tiene problemas del [place], del hígado o del corazón u otros problemas de alice. El tratamiento se centra en normalizar molly niveles de [place]. La atención de seguimiento es quoc parte clave de pradhan tratamiento y seguridad.  Asegúrese de hacer y acudir a todas las citas, y llame a varghese médico si está teniendo problemas. También es quoc buena idea saber los resultados de los exámenes y mantener quoc lista de los medicamentos que racheal. ¿Cómo puede cuidarse en el hogar? · Si varghese médico se lo recomienda, tracy líquidos que contengan sodio. Las bebidas deportivas son Marsa Ackley opción. O puede comer alimentos salados. · Si varghese médico se lo recomienda, limite la cantidad de agua que christine. Y limite los líquidos Patriciaann Ana parte es agua. Estos incluyen el té, el café y [place]. · [employer] medicamentos exactamente demetrio le fueron recetados. Llame a varghese médico si tiene algún problema con los medicamentos. · Hágase análisis para controlar molly niveles de sodio cuando se lo diga varghese médico.  ¿Cuándo debe pedir ayuda? Llame al 911 en cualquier momento que considere que necesita atención de emergencia. Por ejemplo, llame si:  ? · Tiene convulsiones. ? · Se desmayó (perdió el conocimiento). ?Llame a varghese médico ahora mismo o busque atención médica inmediata si:  ? · Está confuso o no puede concentrarse. ? · Tiene poco o nada de apetito. ? · Siente el estómago revuelto o vomita. ? · Tiene dolor de susanna. ? · Tiene cambios en el estado de ánimo. ? · Se siente más fatigado que de costumbre. ?Preste especial atención a los cambios en varghese alice y asegúrese de comunicarse con varghese médico si:  ? · No mejora demetrio se esperaba. ¿Dónde puede encontrar más información en inglés? Saira Vizcaino a http://sebastian-simeon.info/. Preston Benson [id number] en la búsqueda para aprender más acerca de \"Hiponatremia: Instrucciones de cuidado - [ Hyponatremia: Care Instructions ]. \"  Revisado: 14 octubre, 2016  Versión del contenido: 11.4  © 5427-7660 Healthwise, [employer]. Las instrucciones de cuidado fueron adaptadas bajo licencia por [employer] (which disclaims liability or warranty for this information).  Si usted tiene [place] afección médica o sobre estas instrucciones, siempre pregunte a varghese profesional de alice. [employer] niega toda garantía o responsabilidad por varghese uso de esta información. DISCHARGE SUMMARY from Nurse    PATIENT INSTRUCTIONS:    After general anesthesia or intravenous sedation, for 24 hours or while taking prescription Narcotics:  · Limit your activities  · Do not drive and operate hazardous machinery  · Do not make important personal or business decisions  · Do  not drink alcoholic beverages  · If you have not urinated within 8 hours after discharge, please contact your surgeon on call. Report the following to your surgeon:  · Excessive pain, swelling, redness or odor of or around the surgical area  · Temperature over 100.5  · Nausea and vomiting lasting longer than 4 hours or if unable to take medications  · Any signs of decreased circulation or nerve impairment to extremity: change in color, persistent  numbness, tingling, coldness or increase pain  · Any questions    What to do at Home:    *  Please give a list of your current medications to your Primary Care Provider. *  Please update this list whenever your medications are discontinued, doses are      changed, or new medications (including over-the-counter products) are added. *  Please carry medication information at all times in case of emergency situations. These are general instructions for a healthy lifestyle:    No smoking/ No tobacco products/ Avoid exposure to second hand smoke  Surgeon General's Warning:  Quitting smoking now greatly reduces serious risk to your health.     Obesity, smoking, and sedentary lifestyle greatly increases your risk for illness    A healthy diet, regular physical exercise & weight monitoring are important for maintaining a healthy lifestyle    You may be retaining fluid if you have a history of heart failure or if you experience any of the following symptoms:  Weight gain of 3 pounds or more overnight or 5 pounds in a week, increased swelling in our hands or feet or shortness of breath while lying flat in bed. Please call your doctor as soon as you notice any of these symptoms; do not wait until your next office visit. Recognize signs and symptoms of STROKE:    F-face looks uneven    A-arms unable to move or move unevenly    S-speech slurred or non-existent    T-time-call 911 as soon as signs and symptoms begin-DO NOT go       Back to bed or wait to see if you get better-TIME IS BRAIN. Warning Signs of HEART ATTACK     Call 911 if you have these symptoms:   Chest discomfort. Most heart attacks involve discomfort in the center of the chest that lasts more than a few minutes, or that goes away and comes back. It can feel like uncomfortable pressure, squeezing, fullness, or pain.  Discomfort in other areas of the upper body. Symptoms can include pain or discomfort in one or both arms, the back, neck, jaw, or stomach.  Shortness of breath with or without chest discomfort.  Other signs may include breaking out in a cold sweat, nausea, or lightheadedness. Don't wait more than five minutes to call 911 - MINUTES MATTER! Fast action can save your life. Calling 911 is almost always the fastest way to get lifesaving treatment. Emergency Medical Services staff can begin treatment when they arrive -- up to an hour sooner than if someone gets to the hospital by car. The discharge information has been reviewed with the patient. The patient verbalized understanding. Discharge medications reviewed with the patient and appropriate educational materials and side effects teaching were provided.   ___________________________________________________________________________________________________________________________________

## 2018-05-21 NOTE — PROGRESS NOTES
Discharge instructions and prescriptions provided and explained to the pt's neice. Opportunity for questions provided. Instructed to call once ready to leave.

## 2018-05-21 NOTE — PROGRESS NOTES
Patient resting in bed. No acute distress noted. No signs of nausea after given prn zofran. Denies any GI disturbance. VSS. Safety precautions remain in place. Will give SBAR to oncoming RN 7a to 7p.

## 2018-05-21 NOTE — DISCHARGE SUMMARY
Hospitalist Discharge Summary     Patient ID:  Ar Moreno  [id number]  81 y.o.  1945  Admit date: 5/19/2018 11:32 PM  Discharge date and time: 5/21/2018  Attending: Deshawn Balderas MD  PCP:  None  Treatment Team: Attending Provider: Deshawn Balderas MD; Primary Nurse: Jana Martini; Care Manager: Madeline Franco RN; Utilization Review: Leroy Ta RN    Principal Diagnosis Acute hyponatremia   Principal Problem:    Acute hyponatremia (5/20/2018)    Active Problems:    Hypertension (5/20/2018)      Nausea & vomiting (5/20/2018)    dehydration         Hospital Course:  Please refer to the admission H&P for details of presentation. In summary, the patient is   a Mauritian speaking 73yoF with HTN who [facility] ED with n/v x 1 week. Associated with some mild abd cramping in lower quadrants and constipation (now relieved after taking laxatives). She has not been able to keep down food for a few days. No fever/chills, CP, SOB. In the ED, she was found to have Na of 116. No new meds. Only takes a BP med (probably enalapril, but dose unknown) and albuterol. Hospitalist asked to admit for hyponatremia. Patient was started on IV NS and BMP monitored. Her hyponatremia improved quickly and IV fluids were discontinued. She denies nausea, emesis or abd pain and was cleared for discharge. Appears viral gastritis led to dehydration and hypovolemic hyponatremia.     Significant Diagnostic Studies:       Labs: Results:       Chemistry Recent Labs      05/21/18   0717 05/21/18   0009  05/20/18 2013 05/19/18   2319   GLU  87  102*  98   < >  96   NA  133*  131*  128*   < >  116*   K  3.2*  3.3*  3.3*   < >  4.0   CL  94*  92*  90*   < >  72*   CO2  29  29  30   < >  31   BUN  19  19  19   < >  19   CREA  1.24*  1.30*  1.26*   < >  1.23*   CA  9.1  8.7  8.4   < >  9.2   AGAP  10  10  8   < >  13   AP   --    --    --    --   85   TP   --    --    --    --   8.3*   ALB   --    --    --    --   3.9   GLOB   --    -- --    --   4.4*   AGRAT   --    --    --    --   0.9*    < > = values in this interval not displayed. CBC w/Diff Recent Labs      05/21/18   0717  05/20/18   1013  05/19/18   2319   WBC  7.5  9.5  11.2*   RBC  3.90*  3.93*  4.12   HGB  11.7  11.8  12.8   HCT  33.2*  33.0*  33.8*   PLT  274  244  272   GRANS  52   --   60   LYMPH  29   --   24   EOS  8*   --   5      Cardiac Enzymes No results for input(s): CPK, CKND1, MIGUEL A in the last 72 hours. No lab exists for component: CKRMB, TROIP   Coagulation No results for input(s): PTP, INR, APTT in the last 72 hours. No lab exists for component: INREXT    Lipid Panel No results found for: CHOL, CHOLPOCT, CHOLX, CHLST, CHOLV, 122274, HDL, LDL, LDLC, DLDLP, 832858, VLDLC, VLDL, TGLX, TRIGL, TRIGP, TGLPOCT, CHHD, CHHDX   BNP No results for input(s): BNPP in the last 72 hours. Liver Enzymes Recent Labs      05/19/18   2319   TP  8.3*   ALB  3.9   AP  85   SGOT  47*      Thyroid Studies No results found for: T4, T3U, TSH, TSHEXT         Discharge Exam:  Visit Vitals    /74    Pulse 66    Temp 98.2 °F (36.8 °C)    Resp 18    Ht 5' (1.524 m)    Wt 64.3 kg (141 lb 12.8 oz)    SpO2 96%    BMI 27.69 kg/m2     General appearance: alert, cooperative, no distress, appears stated age  Lungs: clear to auscultation bilaterally  Heart: regular rate and rhythm, S1, S2 normal, no murmur, click, rub or gallop  Abdomen: soft, non-tender. Bowel sounds normal. No masses,  no organomegaly  Extremities: no cyanosis or edema  Neurologic: Grossly normal    Disposition: home  Discharge Condition: stable  Patient Instructions: There are no discharge medications for this patient.       Activity: Activity as tolerated  Diet: Regular Diet      Follow-up  ·   PCP in [facility]    Signed:  Mario Smith MD  5/21/2018  1:18 PM

## 2018-05-21 NOTE — PROGRESS NOTES
Pt resting in bed. Pt awakens when spoken to. Pt oriented times 3 at this time. Pt is Danish speaking and spoke with pt though  phone. . Pt on RA at this time. Pt encouraged to call for assistance if needed call light in reach, door open will monitor.